# Patient Record
Sex: MALE | Race: WHITE | NOT HISPANIC OR LATINO | Employment: OTHER | URBAN - METROPOLITAN AREA
[De-identification: names, ages, dates, MRNs, and addresses within clinical notes are randomized per-mention and may not be internally consistent; named-entity substitution may affect disease eponyms.]

---

## 2019-11-16 ENCOUNTER — HOSPITAL ENCOUNTER (INPATIENT)
Facility: HOSPITAL | Age: 79
LOS: 5 days | Discharge: NURSING, REHAB OR LONG-TERM CARE | End: 2019-11-21
Attending: EMERGENCY MEDICINE | Admitting: ORTHOPAEDIC SURGERY

## 2019-11-16 ENCOUNTER — APPOINTMENT (OUTPATIENT)
Dept: GENERAL RADIOLOGY | Facility: HOSPITAL | Age: 79
End: 2019-11-16

## 2019-11-16 ENCOUNTER — APPOINTMENT (OUTPATIENT)
Dept: CT IMAGING | Facility: HOSPITAL | Age: 79
End: 2019-11-16

## 2019-11-16 DIAGNOSIS — S72.002A CLOSED FRACTURE OF NECK OF LEFT FEMUR, INITIAL ENCOUNTER (HCC): Primary | ICD-10-CM

## 2019-11-16 PROBLEM — K21.9 GERD (GASTROESOPHAGEAL REFLUX DISEASE): Status: ACTIVE | Noted: 2019-11-16

## 2019-11-16 PROBLEM — R07.89 CHEST PAIN, ATYPICAL: Status: ACTIVE | Noted: 2019-11-16

## 2019-11-16 PROBLEM — E78.5 HYPERLIPIDEMIA: Status: ACTIVE | Noted: 2019-11-16

## 2019-11-16 PROBLEM — R94.31 ABNORMAL EKG: Status: ACTIVE | Noted: 2019-11-16

## 2019-11-16 PROBLEM — I10 HYPERTENSION: Status: ACTIVE | Noted: 2019-11-16

## 2019-11-16 LAB
ALBUMIN SERPL-MCNC: 4.1 G/DL (ref 3.5–5.2)
ALBUMIN/GLOB SERPL: 1.3 G/DL
ALP SERPL-CCNC: 87 U/L (ref 39–117)
ALT SERPL W P-5'-P-CCNC: 12 U/L (ref 1–41)
ANION GAP SERPL CALCULATED.3IONS-SCNC: 16 MMOL/L (ref 5–15)
APTT PPP: 30.9 SECONDS (ref 22.7–35.4)
AST SERPL-CCNC: 22 U/L (ref 1–40)
BACTERIA UR QL AUTO: ABNORMAL /HPF
BASOPHILS # BLD AUTO: 0.05 10*3/MM3 (ref 0–0.2)
BASOPHILS NFR BLD AUTO: 0.6 % (ref 0–1.5)
BILIRUB SERPL-MCNC: 1.7 MG/DL (ref 0.2–1.2)
BILIRUB UR QL STRIP: NEGATIVE
BUN BLD-MCNC: 12 MG/DL (ref 8–23)
BUN/CREAT SERPL: 13.5 (ref 7–25)
CALCIUM SPEC-SCNC: 9.5 MG/DL (ref 8.6–10.5)
CHLORIDE SERPL-SCNC: 97 MMOL/L (ref 98–107)
CK SERPL-CCNC: 127 U/L (ref 20–200)
CLARITY UR: CLEAR
CO2 SERPL-SCNC: 28 MMOL/L (ref 22–29)
COD CRY URNS QL: ABNORMAL /HPF
COLOR UR: ABNORMAL
CREAT BLD-MCNC: 0.89 MG/DL (ref 0.76–1.27)
DEPRECATED RDW RBC AUTO: 54.3 FL (ref 37–54)
EOSINOPHIL # BLD AUTO: 0.02 10*3/MM3 (ref 0–0.4)
EOSINOPHIL NFR BLD AUTO: 0.2 % (ref 0.3–6.2)
ERYTHROCYTE [DISTWIDTH] IN BLOOD BY AUTOMATED COUNT: 16.2 % (ref 12.3–15.4)
GFR SERPL CREATININE-BSD FRML MDRD: 83 ML/MIN/1.73
GLOBULIN UR ELPH-MCNC: 3.1 GM/DL
GLUCOSE BLD-MCNC: 113 MG/DL (ref 65–99)
GLUCOSE UR STRIP-MCNC: NEGATIVE MG/DL
HCT VFR BLD AUTO: 58.1 % (ref 37.5–51)
HGB BLD-MCNC: 19.3 G/DL (ref 13–17.7)
HGB UR QL STRIP.AUTO: ABNORMAL
HYALINE CASTS UR QL AUTO: ABNORMAL /LPF
IMM GRANULOCYTES # BLD AUTO: 0.05 10*3/MM3 (ref 0–0.05)
IMM GRANULOCYTES NFR BLD AUTO: 0.6 % (ref 0–0.5)
INR PPP: 1.03 (ref 0.9–1.1)
KETONES UR QL STRIP: ABNORMAL
LEUKOCYTE ESTERASE UR QL STRIP.AUTO: ABNORMAL
LYMPHOCYTES # BLD AUTO: 0.7 10*3/MM3 (ref 0.7–3.1)
LYMPHOCYTES NFR BLD AUTO: 7.7 % (ref 19.6–45.3)
MCH RBC QN AUTO: 32.2 PG (ref 26.6–33)
MCHC RBC AUTO-ENTMCNC: 33.2 G/DL (ref 31.5–35.7)
MCV RBC AUTO: 96.8 FL (ref 79–97)
MONOCYTES # BLD AUTO: 0.66 10*3/MM3 (ref 0.1–0.9)
MONOCYTES NFR BLD AUTO: 7.3 % (ref 5–12)
MUCOUS THREADS URNS QL MICRO: ABNORMAL /HPF
NEUTROPHILS # BLD AUTO: 7.56 10*3/MM3 (ref 1.7–7)
NEUTROPHILS NFR BLD AUTO: 83.6 % (ref 42.7–76)
NITRITE UR QL STRIP: POSITIVE
NRBC BLD AUTO-RTO: 0 /100 WBC (ref 0–0.2)
PH UR STRIP.AUTO: 5.5 [PH] (ref 5–8)
PLATELET # BLD AUTO: 145 10*3/MM3 (ref 140–450)
PMV BLD AUTO: 9.9 FL (ref 6–12)
POTASSIUM BLD-SCNC: 3.6 MMOL/L (ref 3.5–5.2)
PROT SERPL-MCNC: 7.2 G/DL (ref 6–8.5)
PROT UR QL STRIP: ABNORMAL
PROTHROMBIN TIME: 13.2 SECONDS (ref 11.7–14.2)
RBC # BLD AUTO: 6 10*6/MM3 (ref 4.14–5.8)
RBC # UR: ABNORMAL /HPF
REF LAB TEST METHOD: ABNORMAL
SODIUM BLD-SCNC: 141 MMOL/L (ref 136–145)
SP GR UR STRIP: >=1.03 (ref 1–1.03)
SQUAMOUS #/AREA URNS HPF: ABNORMAL /HPF
TRANS CELLS #/AREA URNS HPF: ABNORMAL /HPF
TROPONIN T SERPL-MCNC: <0.01 NG/ML (ref 0–0.03)
TROPONIN T SERPL-MCNC: <0.01 NG/ML (ref 0–0.03)
URINE MYOGLOBIN, QUALITATIVE: POSITIVE
UROBILINOGEN UR QL STRIP: ABNORMAL
WBC NRBC COR # BLD: 9.04 10*3/MM3 (ref 3.4–10.8)
WBC UR QL AUTO: ABNORMAL /HPF

## 2019-11-16 PROCEDURE — 81001 URINALYSIS AUTO W/SCOPE: CPT | Performed by: EMERGENCY MEDICINE

## 2019-11-16 PROCEDURE — 99284 EMERGENCY DEPT VISIT MOD MDM: CPT

## 2019-11-16 PROCEDURE — 72170 X-RAY EXAM OF PELVIS: CPT

## 2019-11-16 PROCEDURE — 93010 ELECTROCARDIOGRAM REPORT: CPT | Performed by: INTERNAL MEDICINE

## 2019-11-16 PROCEDURE — 85730 THROMBOPLASTIN TIME PARTIAL: CPT | Performed by: HOSPITALIST

## 2019-11-16 PROCEDURE — 84484 ASSAY OF TROPONIN QUANT: CPT | Performed by: EMERGENCY MEDICINE

## 2019-11-16 PROCEDURE — 25010000002 ONDANSETRON PER 1 MG: Performed by: EMERGENCY MEDICINE

## 2019-11-16 PROCEDURE — 85610 PROTHROMBIN TIME: CPT | Performed by: HOSPITALIST

## 2019-11-16 PROCEDURE — 82550 ASSAY OF CK (CPK): CPT | Performed by: EMERGENCY MEDICINE

## 2019-11-16 PROCEDURE — 83874 ASSAY OF MYOGLOBIN: CPT | Performed by: EMERGENCY MEDICINE

## 2019-11-16 PROCEDURE — 80053 COMPREHEN METABOLIC PANEL: CPT | Performed by: EMERGENCY MEDICINE

## 2019-11-16 PROCEDURE — 70450 CT HEAD/BRAIN W/O DYE: CPT

## 2019-11-16 PROCEDURE — 25010000002 MORPHINE PER 10 MG: Performed by: EMERGENCY MEDICINE

## 2019-11-16 PROCEDURE — 71045 X-RAY EXAM CHEST 1 VIEW: CPT

## 2019-11-16 PROCEDURE — 93005 ELECTROCARDIOGRAM TRACING: CPT | Performed by: EMERGENCY MEDICINE

## 2019-11-16 PROCEDURE — 85025 COMPLETE CBC W/AUTO DIFF WBC: CPT | Performed by: EMERGENCY MEDICINE

## 2019-11-16 PROCEDURE — 73552 X-RAY EXAM OF FEMUR 2/>: CPT

## 2019-11-16 PROCEDURE — 84484 ASSAY OF TROPONIN QUANT: CPT | Performed by: HOSPITALIST

## 2019-11-16 RX ORDER — HYDROMORPHONE HYDROCHLORIDE 1 MG/ML
0.5 INJECTION, SOLUTION INTRAMUSCULAR; INTRAVENOUS; SUBCUTANEOUS
Status: DISCONTINUED | OUTPATIENT
Start: 2019-11-16 | End: 2019-11-21

## 2019-11-16 RX ORDER — ONDANSETRON 2 MG/ML
4 INJECTION INTRAMUSCULAR; INTRAVENOUS EVERY 6 HOURS PRN
Status: DISCONTINUED | OUTPATIENT
Start: 2019-11-16 | End: 2019-11-21 | Stop reason: HOSPADM

## 2019-11-16 RX ORDER — NALOXONE HCL 0.4 MG/ML
0.4 VIAL (ML) INJECTION
Status: DISCONTINUED | OUTPATIENT
Start: 2019-11-16 | End: 2019-11-21

## 2019-11-16 RX ORDER — SODIUM CHLORIDE 0.9 % (FLUSH) 0.9 %
10 SYRINGE (ML) INJECTION AS NEEDED
Status: DISCONTINUED | OUTPATIENT
Start: 2019-11-16 | End: 2019-11-21 | Stop reason: HOSPADM

## 2019-11-16 RX ORDER — SODIUM CHLORIDE 0.9 % (FLUSH) 0.9 %
10 SYRINGE (ML) INJECTION EVERY 12 HOURS SCHEDULED
Status: DISCONTINUED | OUTPATIENT
Start: 2019-11-16 | End: 2019-11-21 | Stop reason: HOSPADM

## 2019-11-16 RX ORDER — ONDANSETRON 4 MG/1
4 TABLET, FILM COATED ORAL EVERY 6 HOURS PRN
Status: DISCONTINUED | OUTPATIENT
Start: 2019-11-16 | End: 2019-11-21 | Stop reason: HOSPADM

## 2019-11-16 RX ORDER — MORPHINE SULFATE 2 MG/ML
4 INJECTION, SOLUTION INTRAMUSCULAR; INTRAVENOUS ONCE
Status: COMPLETED | OUTPATIENT
Start: 2019-11-16 | End: 2019-11-16

## 2019-11-16 RX ORDER — HYDRALAZINE HYDROCHLORIDE 20 MG/ML
10 INJECTION INTRAMUSCULAR; INTRAVENOUS EVERY 6 HOURS PRN
Status: DISCONTINUED | OUTPATIENT
Start: 2019-11-16 | End: 2019-11-21 | Stop reason: HOSPADM

## 2019-11-16 RX ORDER — ONDANSETRON 2 MG/ML
4 INJECTION INTRAMUSCULAR; INTRAVENOUS ONCE
Status: COMPLETED | OUTPATIENT
Start: 2019-11-16 | End: 2019-11-16

## 2019-11-16 RX ORDER — LISINOPRIL AND HYDROCHLOROTHIAZIDE 20; 12.5 MG/1; MG/1
1 TABLET ORAL DAILY
COMMUNITY
End: 2019-11-21 | Stop reason: HOSPADM

## 2019-11-16 RX ORDER — SODIUM CHLORIDE 9 MG/ML
125 INJECTION, SOLUTION INTRAVENOUS CONTINUOUS
Status: DISCONTINUED | OUTPATIENT
Start: 2019-11-16 | End: 2019-11-18

## 2019-11-16 RX ORDER — ACETAMINOPHEN 650 MG/1
650 SUPPOSITORY RECTAL EVERY 4 HOURS PRN
Status: DISCONTINUED | OUTPATIENT
Start: 2019-11-16 | End: 2019-11-21 | Stop reason: HOSPADM

## 2019-11-16 RX ORDER — LISINOPRIL 40 MG/1
40 TABLET ORAL DAILY
Status: ON HOLD | COMMUNITY
End: 2019-11-16

## 2019-11-16 RX ORDER — FAMOTIDINE 20 MG/1
20 TABLET, FILM COATED ORAL
Status: DISCONTINUED | OUTPATIENT
Start: 2019-11-16 | End: 2019-11-21 | Stop reason: HOSPADM

## 2019-11-16 RX ORDER — ACETAMINOPHEN 160 MG/5ML
650 SOLUTION ORAL EVERY 4 HOURS PRN
Status: DISCONTINUED | OUTPATIENT
Start: 2019-11-16 | End: 2019-11-21 | Stop reason: HOSPADM

## 2019-11-16 RX ORDER — HYDROCODONE BITARTRATE AND ACETAMINOPHEN 5; 325 MG/1; MG/1
1 TABLET ORAL EVERY 4 HOURS PRN
Status: DISCONTINUED | OUTPATIENT
Start: 2019-11-16 | End: 2019-11-17

## 2019-11-16 RX ORDER — ACETAMINOPHEN 325 MG/1
650 TABLET ORAL EVERY 4 HOURS PRN
Status: DISCONTINUED | OUTPATIENT
Start: 2019-11-16 | End: 2019-11-21 | Stop reason: HOSPADM

## 2019-11-16 RX ORDER — SODIUM CHLORIDE 0.9 % (FLUSH) 0.9 %
10 SYRINGE (ML) INJECTION AS NEEDED
Status: DISCONTINUED | OUTPATIENT
Start: 2019-11-16 | End: 2019-11-16

## 2019-11-16 RX ADMIN — FAMOTIDINE 20 MG: 20 TABLET, FILM COATED ORAL at 18:04

## 2019-11-16 RX ADMIN — SODIUM CHLORIDE 125 ML/HR: 9 INJECTION, SOLUTION INTRAVENOUS at 11:40

## 2019-11-16 RX ADMIN — SODIUM CHLORIDE 125 ML/HR: 9 INJECTION, SOLUTION INTRAVENOUS at 23:45

## 2019-11-16 RX ADMIN — METOPROLOL TARTRATE 25 MG: 25 TABLET ORAL at 21:55

## 2019-11-16 RX ADMIN — MORPHINE SULFATE 4 MG: 2 INJECTION, SOLUTION INTRAMUSCULAR; INTRAVENOUS at 11:41

## 2019-11-16 RX ADMIN — MUPIROCIN 1 APPLICATION: 20 OINTMENT TOPICAL at 21:56

## 2019-11-16 RX ADMIN — ONDANSETRON 4 MG: 2 INJECTION INTRAMUSCULAR; INTRAVENOUS at 11:40

## 2019-11-16 NOTE — ED NOTES
Pt left leg shortened and externally rotated states no pain when not moving and increased pain with standing. Pt fell Wednesday. Pt falls asleep during triage able to answer all questions.      Aaron Guevara RN  11/16/19 4557

## 2019-11-16 NOTE — CONSULTS
Orthopedic Consult    Patient: Corey Lawson                                           YOB: 1940     Date of Admission: 11/16/2019  9:15 AM            Medical Record Number: [id number]    Attending Physician: Quang Mcadams MD    Consulting Physician: Dary Reyes MD    Reason for Consult: LEFT  hip fracture.    History of Present Illness: 79 y.o. male admitted to [facility] with Closed fracture of neck of left femur, initial encounter (CMS/[facility]) [S72.002A].     The patient was evaluated in the emergency room and was diagnosed with a  hip fracture.   Secondary to the age / multiple medical co morbidities the patient was admitted to the hospitalist.   As I was on call for the emergency room I was consulted for further evaluation and treatment.     The patient was in the usual state of health and fell from standing height at home about 3 days back, Resulting in sudden onset hip pain and inability to ambulate. He has been non compliant with his medications at home.   Denies any history of loss of consciousness, headache, vomiting, or seizures.   Denies any other injuries.   The patient is accompanied by his wife family members  to this hospital visit.     The patient denies any prior  pre-existing pain in the hip.  Patient is a  community ambulator. Patient  uses cane assistive device.   The patient  lives at home with  His wife, is quite active and independent in activities of daily living.  The patient denies history of dementia.    Patient denies any history of: DVT/PE, MRSA, COPD, CHF, CAD, Diabetes mellitus, Dementia or A-Fib.   The patient has history of : Heart attack , left sided weakness ? Stroke, left foot drop chronic  The patient is not on anticoagulants:     Past medical history, past surgical history, social history, family history, ALLERGIES, current medications have been reviewed by me.    Past Medical History:   Diagnosis Date   • GERD (gastroesophageal reflux  "disease)    • Hyperlipidemia    • Hypertension      Past Surgical History:   Procedure Laterality Date   • CARDIAC CATHETERIZATION     • TONSILLECTOMY       Social History     Occupational History   • Not on file   Tobacco Use   • Smoking status: Former Smoker   Substance and Sexual Activity   • Alcohol use: No     Frequency: Never   • Drug use: No   • Sexual activity: Not on file      Social History     Social History Narrative   • Not on file     History reviewed. No pertinent family history.     Allergies   Allergen Reactions   • Nexium [Esomeprazole Magnesium] Diarrhea       Home Medications:  Medications Prior to Admission   Medication Sig Dispense Refill Last Dose   • lisinopril-hydrochlorothiazide (PRINZIDE,ZESTORETIC) 20-12.5 MG per tablet Take 1 tablet by mouth Daily.   11/15/2019 at Unknown time       Current Medications:  Scheduled Meds:  [START ON 11/17/2019] influenza vaccine 0.5 mL Intramuscular Once     Continuous Infusions:  sodium chloride 125 mL/hr Last Rate: 125 mL/hr (11/16/19 1140)     PRN Meds:.    Review of Systems:   A 12 point system review was reviewed with the patient and from the chart  and is negative except as in history of present illness.      Physical Exam: 79 y.o. male                    Vitals:    11/16/19 1130 11/16/19 1142 11/16/19 1230 11/16/19 1330   BP: (!) 176/103  129/90 147/91   BP Location:    Right arm   Patient Position:    Lying   Pulse:  93  96   Resp:    16   Temp:    98.4 °F (36.9 °C)   TempSrc:    Oral   SpO2:  99% 96% 96%   Weight:    73.6 kg (162 lb 4.1 oz)   Height:    180 cm (70.87\")        Gait: Could not be tested , patient is nonambulatory.    Mental/HEENT/cardio/skin: The patient's general appearance was well-nourished, well-hydrated, no acute distress.  Orientation was alert and oriented ×3.  The patient's mood was normal.   Pulmonary exam shows normal late exchange, no labored breathing, or shortness of breath.    The skin exam showed normal temperature " and color in the area of examination.    Extremities: LEFT   lower extremity positive shortening, positive external rotation, attempted movements of the  hip are painful and restricted. The patient is able to do gentle active range of motion of her toes. Gross sensation is intact over the toes.    Pulses: Pulses palpable and equal bilaterally    Diagnostic Tests:    Results from last 7 days   Lab Units 11/16/19  0940   WBC 10*3/mm3 9.04   HEMOGLOBIN g/dL 19.3*   HEMATOCRIT % 58.1*   PLATELETS 10*3/mm3 145     Results from last 7 days   Lab Units 11/16/19  0940   SODIUM mmol/L 141   POTASSIUM mmol/L 3.6   CHLORIDE mmol/L 97*   CO2 mmol/L 28.0   BUN mg/dL 12   CREATININE mg/dL 0.89   GLUCOSE mg/dL 113*   CALCIUM mg/dL 9.5         No results found for: URICACID  No results found for: CRYSTAL  Microbiology Results (last 10 days)     ** No results found for the last 240 hours. **        No radiology results for the last 7 days    Assessment: Left  Intracapsular Hip Fracture. Paul type 4    Patient Active Problem List   Diagnosis   • Closed fracture of neck of left femur (CMS/HCC)       Plan:    Options and alternatives have been discussed in detail with patient and  family.   The patient is indicated for a partial or total hip arthroplasty.    The likely,  Risks and benefits of the procedure including but not limited to infection, DVT, pulmonary embolism,  leg length discrepancy, recurrent dislocation, possibility of injury to nerves or vessels, possibility of periprosthetic fractures have been discussed in detail.  Despite the risks involved, The patient and patient's family  would like to proceed.     The patient is being scheduled for a  total hip arthroplasty by anterior  approach at [facility] tentatively for Nov 17, 2019.     Patient will be made NPO.   Obtain informed consent.     The patient's admitting service has seen the patient and the patient is waiting to be cleared to the operating room.    Date:  11/16/2019    Dary Reyes MD    CC: Provider, No Known; MD Pema Chirinos Lyle E, MD

## 2019-11-16 NOTE — ED TRIAGE NOTES
EMs reports pt fell on Wednesday because of weakness in his left leg. Pt reports the pain in his leg is increasing. Pt reports hitting his head but denies blood thinners. Pt reports he is also having hallucinations

## 2019-11-16 NOTE — PLAN OF CARE
Problem: Patient Care Overview  Goal: Plan of Care Review  Outcome: Ongoing (interventions implemented as appropriate)   11/16/19 1330 11/16/19 8667   Plan of Care Review   Progress --  no change   OTHER   Outcome Summary --  pt admitted from ER today SP fall at home on wednesday. unable to bear weight and increased pain. plan to go to OR for total hip tomorrow. NPO after mn. confusion noted. educated on the importance of BP monitoring and the importance of medication as ordered for HO HTN. Verbalized understanding. will cont to monitor.    Coping/Psychosocial   Plan of Care Reviewed With patient --      Goal: Individualization and Mutuality  Outcome: Ongoing (interventions implemented as appropriate)    Goal: Discharge Needs Assessment  Outcome: Ongoing (interventions implemented as appropriate)    Goal: Interprofessional Rounds/Family Conf  Outcome: Ongoing (interventions implemented as appropriate)      Problem: Fall Risk (Adult)  Goal: Identify Related Risk Factors and Signs and Symptoms  Outcome: Ongoing (interventions implemented as appropriate)    Goal: Absence of Fall  Outcome: Ongoing (interventions implemented as appropriate)      Problem: Skin Injury Risk (Adult)  Goal: Identify Related Risk Factors and Signs and Symptoms  Outcome: Ongoing (interventions implemented as appropriate)    Goal: Skin Health and Integrity  Outcome: Ongoing (interventions implemented as appropriate)      Problem: Fracture Orthopaedic (Adult)  Goal: Signs and Symptoms of Listed Potential Problems Will be Absent, Minimized or Managed (Fracture Orthopaedic)  Outcome: Ongoing (interventions implemented as appropriate)

## 2019-11-16 NOTE — H&P
HISTORY AND PHYSICAL   [facility]        Patient Identification:  Name: Corey Lawson  Age: 79 y.o.  Sex: male  :  1940  MRN: [id number]                     Primary Care Physician: Provider, No Known    Chief Complaint:  Left hip pain    History of Present Illness:           The patient is a 79-year-old white male with history of gastroesophageal reflux disease, hypertension hyperlipidemia who was admitted with history of having fallen down when he went to the bathroom 3 days prior to admission.  He has been having some hip pain ever since and a lot of difficulty getting around.  Finally his family got him to come to the hospital for further evaluation and he was found to have a left femoral neck fracture.  He has not had any nausea or vomiting.  He denies having any fever or chills.  He has not been short of air.  He has had a little bit of aching in his chest but he thinks it may be due to reflux symptoms.  Prior to falling down he was not having any exertional chest pain.  He admits that he has been noncompliant and is been out of all of his medicine for several months and has not been seeing a physician.  He moved here from [place].  The patient was evaluated in the ER and admitted for left hip fracture.    Past Medical History:  Past Medical History:   Diagnosis Date   • GERD (gastroesophageal reflux disease)    • Hyperlipidemia    • Hypertension      Past Surgical History:  Past Surgical History:   Procedure Laterality Date   • CARDIAC CATHETERIZATION     • TONSILLECTOMY        Home Meds:  Medications Prior to Admission   Medication Sig Dispense Refill Last Dose   • lisinopril-hydrochlorothiazide (PRINZIDE,ZESTORETIC) 20-12.5 MG per tablet Take 1 tablet by mouth Daily.   11/15/2019 at Unknown time     Current meds    Current Facility-Administered Medications:   •  acetaminophen (TYLENOL) tablet 650 mg, 650 mg, Oral, Q4H PRN **OR** acetaminophen (TYLENOL) 160 MG/5ML solution 650 mg, 650 mg,  Oral, Q4H PRN **OR** acetaminophen (TYLENOL) suppository 650 mg, 650 mg, Rectal, Q4H PRN, Quang Mcadams MD  •  famotidine (PEPCID) tablet 20 mg, 20 mg, Oral, BID AC, Quang Mcadams MD, 20 mg at 11/16/19 1804  •  hydrALAZINE (APRESOLINE) injection 10 mg, 10 mg, Intravenous, Q6H PRN, Quang Mcadams MD  •  HYDROcodone-acetaminophen (NORCO) 5-325 MG per tablet 1 tablet, 1 tablet, Oral, Q4H PRN, Quang Mcadams MD  •  HYDROmorphone (DILAUDID) injection 0.5 mg, 0.5 mg, Intravenous, Q2H PRN **AND** naloxone (NARCAN) injection 0.4 mg, 0.4 mg, Intravenous, Q5 Min PRN, Quang Mcadams MD  •  [START ON 11/17/2019] influenza vac split quad (FLUZONE,FLUARIX,AFLURIA) injection 0.5 mL, 0.5 mL, Intramuscular, Once, Quang Mcadams MD  •  metoprolol tartrate (LOPRESSOR) tablet 25 mg, 25 mg, Oral, Q12H, Quang Mcadams MD  •  mupirocin (BACTROBAN) 2 % nasal ointment 1 application, 1 application, Each Nare, Once, aDry Reyes MD  •  ondansetron (ZOFRAN) tablet 4 mg, 4 mg, Oral, Q6H PRN **OR** ondansetron (ZOFRAN) injection 4 mg, 4 mg, Intravenous, Q6H PRN, Quang Mcadams MD  •  sodium chloride 0.9 % flush 10 mL, 10 mL, Intravenous, Q12H, Quang Mcadams MD  •  sodium chloride 0.9 % flush 10 mL, 10 mL, Intravenous, PRN, Quang Mcadams MD  •  sodium chloride 0.9 % infusion, 125 mL/hr, Intravenous, Continuous, Jared Manzo MD, Last Rate: 125 mL/hr at 11/16/19 1140, 125 mL/hr at 11/16/19 1140  Allergies:  Allergies   Allergen Reactions   • Nexium [Esomeprazole Magnesium] Diarrhea     Immunizations:  There is no immunization history for the selected administration types on file for this patient.  Social History:   Social History     Social History Narrative   • Not on file     Social History     Socioeconomic History   • Marital status:      Spouse name: Not on file   • Number of children: Not on file   • Years of education: Not on file   • Highest education level: Not on file   Tobacco Use   • Smoking status: Former  "Smoker   Substance and Sexual Activity   • Alcohol use: No     Frequency: Never   • Drug use: No       Family History:  Family History   Problem Relation Age of Onset   • Pancreatic cancer Mother    • Cancer Father         Review of Systems  See history of present illness and past medical history.  Patient denies headache, dizziness, syncope, falls, trauma, change in vision, change in hearing, change in taste, changes in weight, changes in appetite, focal weakness, numbness, or paresthesia.  Patient denies  palpitations, dyspnea, orthopnea, PND, cough, sinus pressure, rhinorrhea, epistaxis, hemoptysis, nausea, vomiting, hematemesis, diarrhea, constipation or hematchezia.  Denies cold or heat intolerance, polydipsia, polyuria, polyphagia. Denies hematuria, pyuria, dysuria, hesitancy, frequency or urgency.  Denies fever, chills, sweats, night sweats.   Remainder of ROS is negative.    Objective:  tMax 24 hrs: Temp (24hrs), Av.1 °F (36.7 °C), Min:97.9 °F (36.6 °C), Max:98.4 °F (36.9 °C)    Vitals Ranges:   Temp:  [97.9 °F (36.6 °C)-98.4 °F (36.9 °C)] 97.9 °F (36.6 °C)  Heart Rate:  [] 96  Resp:  [16-18] 16  BP: (129-176)/() 140/97      Exam:  /97 (BP Location: Right arm, Patient Position: Lying)   Pulse 96   Temp 97.9 °F (36.6 °C) (Oral)   Resp 16   Ht 180 cm (70.87\")   Wt 73.6 kg (162 lb 4.1 oz)   SpO2 97%   BMI 22.72 kg/m²     General Appearance:    Alert, cooperative, no distress, appears stated age   Head:    Normocephalic, without obvious abnormality, atraumatic   Eyes:    PERRL, conjunctiva/corneas clear, EOM's intact, both eyes   Ears:    Normal external ear canals, both ears   Nose:   Nares normal, septum midline, mucosa normal, no drainage    or sinus tenderness   Throat:   Lips, mucosa, and tongue normal   Neck:   Supple, symmetrical, trachea midline, no adenopathy;     thyroid:  no enlargement/tenderness/nodules; no carotid    bruit or JVD   Back:     Symmetric, no curvature, ROM " normal, no CVA tenderness   Lungs:     Clear to auscultation bilaterally, respirations unlabored   Chest Wall:    No tenderness or deformity    Heart:    Regular rate and rhythm, S1 and S2 normal, no murmur, rub   or gallop   Abdomen:     Soft, non-tender, bowel sounds active all four quadrants,     no masses, no hepatomegaly, no splenomegaly   Extremities:   Extremities normal,left hip deformity, no cyanosis or edema   Pulses:   2+ and symmetric all extremities   Skin:   Skin color, texture, turgor normal, no rashes or lesions   Lymph nodes:   Cervical, supraclavicular, and axillary nodes normal   Neurologic:   CNII-XII intact, normal strength, sensation intact throughout      .    Data Review:  Lab Results (last 72 hours)     Procedure Component Value Units Date/Time    Comprehensive Metabolic Panel [[id number]]  (Abnormal) Collected:  11/16/19 0940    Specimen:  Blood Updated:  11/16/19 1016     Glucose 113 mg/dL      BUN 12 mg/dL      Creatinine 0.89 mg/dL      Sodium 141 mmol/L      Potassium 3.6 mmol/L      Chloride 97 mmol/L      CO2 28.0 mmol/L      Calcium 9.5 mg/dL      Total Protein 7.2 g/dL      Albumin 4.10 g/dL      ALT (SGPT) 12 U/L      AST (SGOT) 22 U/L      Alkaline Phosphatase 87 U/L      Total Bilirubin 1.7 mg/dL      eGFR Non African Amer 83 mL/min/1.73      Globulin 3.1 gm/dL      A/G Ratio 1.3 g/dL      BUN/Creatinine Ratio 13.5     Anion Gap 16.0 mmol/L     Narrative:       GFR Normal >60  Chronic Kidney Disease <60  Kidney Failure <15    Troponin [[id number]]  (Normal) Collected:  11/16/19 0940    Specimen:  Blood Updated:  11/16/19 1016     Troponin T <0.010 ng/mL     Narrative:       Troponin T Reference Range:  <= 0.03 ng/mL-   Negative for AMI  >0.03 ng/mL-     Abnormal for myocardial necrosis.  Clinicians would have to utilize clinical acumen, EKG, Troponin and serial changes to determine if it is an Acute Myocardial Infarction or myocardial injury due to an underlying chronic condition.      CK [[id number]]  (Normal) Collected:  11/16/19 0940    Specimen:  Blood Updated:  11/16/19 1016     Creatine Kinase 127 U/L     CBC & Differential [[id number]] Collected:  11/16/19 0940    Specimen:  Blood Updated:  11/16/19 0959    Narrative:       The following orders were created for panel order CBC & Differential.  Procedure                               Abnormality         Status                     ---------                               -----------         ------                     CBC Auto Differential[[id number]]        Abnormal            Final result                 Please view results for these tests on the individual orders.    CBC Auto Differential [[id number]]  (Abnormal) Collected:  11/16/19 0940    Specimen:  Blood Updated:  11/16/19 0959     WBC 9.04 10*3/mm3      RBC 6.00 10*6/mm3      Hemoglobin 19.3 g/dL      Hematocrit 58.1 %      MCV 96.8 fL      MCH 32.2 pg      MCHC 33.2 g/dL      RDW 16.2 %      RDW-SD 54.3 fl      MPV 9.9 fL      Platelets 145 10*3/mm3      Neutrophil % 83.6 %      Lymphocyte % 7.7 %      Monocyte % 7.3 %      Eosinophil % 0.2 %      Basophil % 0.6 %      Immature Grans % 0.6 %      Neutrophils, Absolute 7.56 10*3/mm3      Lymphocytes, Absolute 0.70 10*3/mm3      Monocytes, Absolute 0.66 10*3/mm3      Eosinophils, Absolute 0.02 10*3/mm3      Basophils, Absolute 0.05 10*3/mm3      Immature Grans, Absolute 0.05 10*3/mm3      nRBC 0.0 /100 WBC                    Imaging Results (All)     Procedure Component Value Units Date/Time    XR Chest 1 View [[id number]] Collected:  11/16/19 1049     Updated:  11/16/19 1210    Narrative:       ONE VIEW AP PELVIS AND TWO VIEW LEFT FEMUR     HISTORY: Fell. Hip pain.     FINDINGS: There is a fracture through the neck of the left femur with  very slight superior elevation of the femur relative to the femoral  head. The remainder of the femur is unremarkable.     ONE VIEW SUPINE CHEST     HISTORY: Fell. Hip fracture. Hypertension.      FINDINGS: The lungs are well expanded and clear and the heart is top  normal in size. There is no acute disease.     This report was finalized on 11/16/2019 12:07 PM by Dr. Pee Emmanuel M.D.       XR Pelvis 1 or 2 View [[id number]] Collected:  11/16/19 1049     Updated:  11/16/19 1210    Narrative:       ONE VIEW AP PELVIS AND TWO VIEW LEFT FEMUR     HISTORY: Fell. Hip pain.     FINDINGS: There is a fracture through the neck of the left femur with  very slight superior elevation of the femur relative to the femoral  head. The remainder of the femur is unremarkable.     ONE VIEW SUPINE CHEST     HISTORY: Fell. Hip fracture. Hypertension.     FINDINGS: The lungs are well expanded and clear and the heart is top  normal in size. There is no acute disease.     This report was finalized on 11/16/2019 12:07 PM by Dr. Pee Emmanuel M.D.       XR Femur 2 View Left [[id number]] Collected:  11/16/19 1049     Updated:  11/16/19 1210    Narrative:       ONE VIEW AP PELVIS AND TWO VIEW LEFT FEMUR     HISTORY: Fell. Hip pain.     FINDINGS: There is a fracture through the neck of the left femur with  very slight superior elevation of the femur relative to the femoral  head. The remainder of the femur is unremarkable.     ONE VIEW SUPINE CHEST     HISTORY: Fell. Hip fracture. Hypertension.     FINDINGS: The lungs are well expanded and clear and the heart is top  normal in size. There is no acute disease.     This report was finalized on 11/16/2019 12:07 PM by Dr. Pee Emmanuel M.D.       CT Head Without Contrast [[id number]] Collected:  11/16/19 1044     Updated:  11/16/19 1210    Narrative:       CT BRAIN WITHOUT CONTRAST     HISTORY: Fell. Weakness in left leg.     TECHNIQUE/FINDINGS: The CT scan was performed as an emergency procedure  through the brain without contrast. There is mild diffuse atrophy and  chronic small vessel ischemic change. There is slight asymmetry of the  lateral ventricles which is within normal limits  and there is no  evidence of acute hemorrhage or mass effect. The visualized sinuses are  clear.           Radiation dose reduction techniques were utilized, including automated  exposure control and exposure modulation based on body size.     This report was finalized on 11/16/2019 12:07 PM by Dr. Pee Emmanuel M.D.           Past Medical History:   Diagnosis Date   • GERD (gastroesophageal reflux disease)    • Hyperlipidemia    • Hypertension        Assessment:  Active Hospital Problems    Diagnosis  POA   • **Closed fracture of neck of left femur (CMS/HCC) [S72.002A]  Yes   • Hypertension [I10]  Unknown   • Hyperlipidemia [E78.5]  Unknown   • GERD (gastroesophageal reflux disease) [K21.9]  Unknown   • Chest pain, atypical [R07.89]  Unknown   • Abnormal EKG [R94.31]  Unknown      Resolved Hospital Problems   No resolved problems to display.       Plan:  The patient is admitted to the hospital and will consult orthopedic surgery for surgical treatment of his hip fracture.  We will put him on a beta-blocker for his blood pressure and check some repeat troponin levels and repeat lab.  If his troponins are normal I will clear him as medically stable for surgery.  If his troponins elevate would  get further input from cardiology.  We will add some as needed hydralazine IV for blood pressure.  I have ordered medication for pain.  Hopefully can proceed with surgery early tomorrow morning and certainly if further delays in fixing his fractured hip increases his morbidity and mortality.  Discussed with Dr. Reyes.    Quang Macdams MD  11/16/2019  7:36 PM

## 2019-11-16 NOTE — ED PROVIDER NOTES
" EMERGENCY DEPARTMENT ENCOUNTER    CHIEF COMPLAINT  Chief Complaint: L hip pain  History given by: pt  History limited by: nothing  Room Number: 10/10  PMD: Provider, No Known      HPI:  Pt is a 79 y.o. male who presents complaining of constant worsening L hip pain due to a fall that occurred 3 days ago.  Pt states that he tripped, landed on his L hip and struck his head on the ground.  He has been unable to walk since the fall.  Pt reports chronic L leg weakness due to a previous stroke.  He also confirms chronic CP and chronic SOA.  He denies visual disturbances, HA, nausea, vomiting, and LOC.  He states that he is supposed to be taking BP medications but has not for some time due to \"carelessness.\"    Duration:  3 days  Onset: sudden  Timing: constant  Location: L hip  Progression: worsening    PAST MEDICAL HISTORY  Active Ambulatory Problems     Diagnosis Date Noted   • No Active Ambulatory Problems     Resolved Ambulatory Problems     Diagnosis Date Noted   • No Resolved Ambulatory Problems     Past Medical History:   Diagnosis Date   • GERD (gastroesophageal reflux disease)    • Hyperlipidemia    • Hypertension        PAST SURGICAL HISTORY  Past Surgical History:   Procedure Laterality Date   • CARDIAC CATHETERIZATION     • TONSILLECTOMY         FAMILY HISTORY  History reviewed. No pertinent family history.    SOCIAL HISTORY  Social History     Socioeconomic History   • Marital status:      Spouse name: Not on file   • Number of children: Not on file   • Years of education: Not on file   • Highest education level: Not on file   Tobacco Use   • Smoking status: Former Smoker   Substance and Sexual Activity   • Alcohol use: No     Frequency: Never   • Drug use: No       ALLERGIES  Nexium [esomeprazole magnesium]    REVIEW OF SYSTEMS  Review of Systems   Constitutional: Negative for activity change, appetite change and fever.   HENT: Negative for congestion and sore throat.    Eyes: Negative.  "   Respiratory: Positive for shortness of breath ( chronic). Negative for cough.    Cardiovascular: Positive for chest pain ( chronic). Negative for leg swelling.   Gastrointestinal: Negative for abdominal pain, diarrhea and vomiting.   Endocrine: Negative.    Genitourinary: Negative for decreased urine volume and dysuria.   Musculoskeletal: Positive for arthralgias (L hip) and gait problem. Negative for neck pain.   Skin: Negative for rash and wound.   Allergic/Immunologic: Negative.    Neurological: Positive for weakness (Chronic, L leg). Negative for syncope, numbness and headaches.   Hematological: Negative.    Psychiatric/Behavioral: Negative.    All other systems reviewed and are negative.      PHYSICAL EXAM  ED Triage Vitals [11/16/19 0914]   Temp Heart Rate Resp BP SpO2   98.1 °F (36.7 °C) 106 18 (!) 171/111 99 %      Temp src Heart Rate Source Patient Position BP Location FiO2 (%)   Tympanic -- -- -- --       Physical Exam   Constitutional: He is oriented to person, place, and time. No distress.   HENT:   Head: Normocephalic and atraumatic.   Eyes: EOM are normal. Pupils are equal, round, and reactive to light.   Neck: Normal range of motion. Neck supple.   Cardiovascular: Normal rate, regular rhythm and normal heart sounds.   Pulmonary/Chest: Effort normal and breath sounds normal. No respiratory distress.   Abdominal: Soft. There is no tenderness. There is no rebound and no guarding.   Musculoskeletal: He exhibits no edema.   L hip and groin tenderness. L leg is slightly shorter than R leg.  Pain increases with L hip flexion.     Neurological: He is alert and oriented to person, place, and time. He has normal sensation and normal strength.   Skin: Skin is warm and dry.   Psychiatric: Mood and affect normal.   Nursing note and vitals reviewed.      LAB RESULTS  Lab Results (last 24 hours)     Procedure Component Value Units Date/Time    CBC & Differential [[id number]] Collected:  11/16/19 0940    Specimen:   Blood Updated:  11/16/19 0959    Narrative:       The following orders were created for panel order CBC & Differential.  Procedure                               Abnormality         Status                     ---------                               -----------         ------                     CBC Auto Differential[[id number]]        Abnormal            Final result                 Please view results for these tests on the individual orders.    Comprehensive Metabolic Panel [[id number]]  (Abnormal) Collected:  11/16/19 0940    Specimen:  Blood Updated:  11/16/19 1016     Glucose 113 mg/dL      BUN 12 mg/dL      Creatinine 0.89 mg/dL      Sodium 141 mmol/L      Potassium 3.6 mmol/L      Chloride 97 mmol/L      CO2 28.0 mmol/L      Calcium 9.5 mg/dL      Total Protein 7.2 g/dL      Albumin 4.10 g/dL      ALT (SGPT) 12 U/L      AST (SGOT) 22 U/L      Alkaline Phosphatase 87 U/L      Total Bilirubin 1.7 mg/dL      eGFR Non African Amer 83 mL/min/1.73      Globulin 3.1 gm/dL      A/G Ratio 1.3 g/dL      BUN/Creatinine Ratio 13.5     Anion Gap 16.0 mmol/L     Narrative:       GFR Normal >60  Chronic Kidney Disease <60  Kidney Failure <15    Troponin [[id number]]  (Normal) Collected:  11/16/19 0940    Specimen:  Blood Updated:  11/16/19 1016     Troponin T <0.010 ng/mL     Narrative:       Troponin T Reference Range:  <= 0.03 ng/mL-   Negative for AMI  >0.03 ng/mL-     Abnormal for myocardial necrosis.  Clinicians would have to utilize clinical acumen, EKG, Troponin and serial changes to determine if it is an Acute Myocardial Infarction or myocardial injury due to an underlying chronic condition.     CK [[id number]]  (Normal) Collected:  11/16/19 0940    Specimen:  Blood Updated:  11/16/19 1016     Creatine Kinase 127 U/L     CBC Auto Differential [[id number]]  (Abnormal) Collected:  11/16/19 0940    Specimen:  Blood Updated:  11/16/19 0959     WBC 9.04 10*3/mm3      RBC 6.00 10*6/mm3      Hemoglobin 19.3 g/dL      Hematocrit  58.1 %      MCV 96.8 fL      MCH 32.2 pg      MCHC 33.2 g/dL      RDW 16.2 %      RDW-SD 54.3 fl      MPV 9.9 fL      Platelets 145 10*3/mm3      Neutrophil % 83.6 %      Lymphocyte % 7.7 %      Monocyte % 7.3 %      Eosinophil % 0.2 %      Basophil % 0.6 %      Immature Grans % 0.6 %      Neutrophils, Absolute 7.56 10*3/mm3      Lymphocytes, Absolute 0.70 10*3/mm3      Monocytes, Absolute 0.66 10*3/mm3      Eosinophils, Absolute 0.02 10*3/mm3      Basophils, Absolute 0.05 10*3/mm3      Immature Grans, Absolute 0.05 10*3/mm3      nRBC 0.0 /100 WBC           I ordered the above labs and reviewed the results    RADIOLOGY  XR Pelvis 1 or 2 View   ONE VIEW AP PELVIS AND TWO VIEW LEFT FEMUR     HISTORY: Fell. Hip pain.     FINDINGS: There is a fracture through the neck of the left femur with  very slight superior elevation of the femur relative to the femoral  head. The remainder of the femur is unremarkable.     ONE VIEW SUPINE CHEST     HISTORY: Fell. Hip fracture. Hypertension.     FINDINGS: The lungs are well expanded and clear and the heart is top  normal in size. There is no acute disease.      XR Femur 2 View Left         XR Chest 1 View         CT Head Without Contrast   CT BRAIN WITHOUT CONTRAST     HISTORY: Fell. Weakness in left leg.     TECHNIQUE/FINDINGS: The CT scan was performed as an emergency procedure  through the brain without contrast. There is mild diffuse atrophy and  chronic small vessel ischemic change. There is slight asymmetry of the  lateral ventricles which is within normal limits and there is no  evidence of acute hemorrhage or mass effect. The visualized sinuses are  clear.           Radiation dose reduction techniques were utilized, including automated  exposure control and exposure modulation based on body size.           I ordered the above noted radiological studies. Interpreted by radiologist. Discussed with radiologist Dr. Emmanuel. Reviewed by me in PACS.        PROCEDURES  Procedures  EKG          EKG time: 0934  Rhythm/Rate: Tachy/100  P waves and MD: 1st degree block  QRS, axis: LAD, RBBB   ST and T waves: nonspecific ST changes     Interpreted Contemporaneously by me, independently viewed  No prior EKG for comparison      PROGRESS AND CONSULTS     1044: Rechecked the patient who is resting comfortably and in NAD. Patient is stable.  Informed the patient of imaging which showed L femoral neck fracture. Discussed the plan for admission for further evaluation and management. Pt understands and agrees with the plan, all questions answered.    1152: Received call from Dr. Mcadams, ([facility]) and discussed pt's case.  Dr. Mcadams will admit the pt.        MEDICAL DECISION MAKING  Results were reviewed/discussed with the patient and they were also made aware of online access. Pt also made aware that some labs, such as cultures, will not be resulted during ER visit and follow up with PMD is necessary.     MDM  Number of Diagnoses or Management Options     Amount and/or Complexity of Data Reviewed  Clinical lab tests: ordered and reviewed  Tests in the radiology section of CPT®: reviewed and ordered (CT head - negative acute  CXR - negative acute  XR L femur - fracture through the neck of the left femur)  Tests in the medicine section of CPT®: ordered and reviewed (See EKG procedure note)  Discussion of test results with the performing providers: yes (Dr. Emmanuel (Radiology))  Discuss the patient with other providers: yes (Dr. Mcadams ([facility]))  Independent visualization of images, tracings, or specimens: yes           DIAGNOSIS  Final diagnoses:   Closed fracture of neck of left femur, initial encounter (CMS/[facility])       DISPOSITION  ADMISSION    Discussed treatment plan and reason for admission with pt/family and admitting physician.  Pt/family voiced understanding of the plan for admission for further testing/treatment as needed.         Latest Documented Vital Signs:  As of 12:31  PM  BP- (!) 176/103 HR- 93 Temp- 98.1 °F (36.7 °C) (Tympanic) O2 sat- 99%    --  Documentation assistance provided by yuan Garzon for Dr. Jovany MD.  Information recorded by the scribe was done at my direction and has been verified and validated by me.       Bobby aGrzon  11/16/19 1231       Jared Manzo MD  11/16/19 1230

## 2019-11-17 ENCOUNTER — APPOINTMENT (OUTPATIENT)
Dept: GENERAL RADIOLOGY | Facility: HOSPITAL | Age: 79
End: 2019-11-17

## 2019-11-17 ENCOUNTER — ANESTHESIA (OUTPATIENT)
Dept: PERIOP | Facility: HOSPITAL | Age: 79
End: 2019-11-17

## 2019-11-17 ENCOUNTER — ANESTHESIA EVENT (OUTPATIENT)
Dept: PERIOP | Facility: HOSPITAL | Age: 79
End: 2019-11-17

## 2019-11-17 PROBLEM — S72.002A CLOSED FRACTURE OF NECK OF LEFT FEMUR (HCC): Status: RESOLVED | Noted: 2019-11-16 | Resolved: 2019-11-17

## 2019-11-17 LAB
ANION GAP SERPL CALCULATED.3IONS-SCNC: 16.6 MMOL/L (ref 5–15)
BASOPHILS # BLD AUTO: 0.07 10*3/MM3 (ref 0–0.2)
BASOPHILS NFR BLD AUTO: 0.7 % (ref 0–1.5)
BUN BLD-MCNC: 16 MG/DL (ref 8–23)
BUN/CREAT SERPL: 19.8 (ref 7–25)
CALCIUM SPEC-SCNC: 9 MG/DL (ref 8.6–10.5)
CHLORIDE SERPL-SCNC: 103 MMOL/L (ref 98–107)
CO2 SERPL-SCNC: 22.4 MMOL/L (ref 22–29)
CREAT BLD-MCNC: 0.81 MG/DL (ref 0.76–1.27)
DEPRECATED RDW RBC AUTO: 53.8 FL (ref 37–54)
EOSINOPHIL # BLD AUTO: 0.05 10*3/MM3 (ref 0–0.4)
EOSINOPHIL NFR BLD AUTO: 0.5 % (ref 0.3–6.2)
ERYTHROCYTE [DISTWIDTH] IN BLOOD BY AUTOMATED COUNT: 15.2 % (ref 12.3–15.4)
GFR SERPL CREATININE-BSD FRML MDRD: 92 ML/MIN/1.73
GLUCOSE BLD-MCNC: 151 MG/DL (ref 65–99)
HCT VFR BLD AUTO: 51.6 % (ref 37.5–51)
HGB BLD-MCNC: 17.4 G/DL (ref 13–17.7)
IMM GRANULOCYTES # BLD AUTO: 0.04 10*3/MM3 (ref 0–0.05)
IMM GRANULOCYTES NFR BLD AUTO: 0.4 % (ref 0–0.5)
LYMPHOCYTES # BLD AUTO: 1.1 10*3/MM3 (ref 0.7–3.1)
LYMPHOCYTES NFR BLD AUTO: 11.7 % (ref 19.6–45.3)
MCH RBC QN AUTO: 32.6 PG (ref 26.6–33)
MCHC RBC AUTO-ENTMCNC: 33.7 G/DL (ref 31.5–35.7)
MCV RBC AUTO: 96.6 FL (ref 79–97)
MONOCYTES # BLD AUTO: 0.96 10*3/MM3 (ref 0.1–0.9)
MONOCYTES NFR BLD AUTO: 10.2 % (ref 5–12)
NEUTROPHILS # BLD AUTO: 7.15 10*3/MM3 (ref 1.7–7)
NEUTROPHILS NFR BLD AUTO: 76.5 % (ref 42.7–76)
NRBC BLD AUTO-RTO: 0.1 /100 WBC (ref 0–0.2)
PLATELET # BLD AUTO: 149 10*3/MM3 (ref 140–450)
PMV BLD AUTO: 9.8 FL (ref 6–12)
POTASSIUM BLD-SCNC: 3.9 MMOL/L (ref 3.5–5.2)
RBC # BLD AUTO: 5.34 10*6/MM3 (ref 4.14–5.8)
SODIUM BLD-SCNC: 142 MMOL/L (ref 136–145)
TROPONIN T SERPL-MCNC: <0.01 NG/ML (ref 0–0.03)
TSH SERPL DL<=0.05 MIU/L-ACNC: 2.01 UIU/ML (ref 0.27–4.2)
WBC NRBC COR # BLD: 9.37 10*3/MM3 (ref 3.4–10.8)

## 2019-11-17 PROCEDURE — 25010000002 DEXAMETHASONE PER 1 MG: Performed by: NURSE ANESTHETIST, CERTIFIED REGISTERED

## 2019-11-17 PROCEDURE — 76000 FLUOROSCOPY <1 HR PHYS/QHP: CPT

## 2019-11-17 PROCEDURE — 25010000002 CLONIDINE PER 1 MG: Performed by: ORTHOPAEDIC SURGERY

## 2019-11-17 PROCEDURE — 84443 ASSAY THYROID STIM HORMONE: CPT | Performed by: HOSPITALIST

## 2019-11-17 PROCEDURE — 25010000002 PROPOFOL 10 MG/ML EMULSION: Performed by: NURSE ANESTHETIST, CERTIFIED REGISTERED

## 2019-11-17 PROCEDURE — 25010000002 KETOROLAC TROMETHAMINE PER 15 MG: Performed by: ORTHOPAEDIC SURGERY

## 2019-11-17 PROCEDURE — 25010000002 ROPIVACAINE PER 1 MG: Performed by: ORTHOPAEDIC SURGERY

## 2019-11-17 PROCEDURE — 80048 BASIC METABOLIC PNL TOTAL CA: CPT | Performed by: HOSPITALIST

## 2019-11-17 PROCEDURE — 25010000002 HYDRALAZINE PER 20 MG: Performed by: HOSPITALIST

## 2019-11-17 PROCEDURE — 73501 X-RAY EXAM HIP UNI 1 VIEW: CPT

## 2019-11-17 PROCEDURE — 25010000003 CEFAZOLIN IN DEXTROSE 2-4 GM/100ML-% SOLUTION: Performed by: ORTHOPAEDIC SURGERY

## 2019-11-17 PROCEDURE — 25010000002 ONDANSETRON PER 1 MG: Performed by: HOSPITALIST

## 2019-11-17 PROCEDURE — 25010000002 NEOSTIGMINE PER 0.5 MG: Performed by: NURSE ANESTHETIST, CERTIFIED REGISTERED

## 2019-11-17 PROCEDURE — 25010000002 HYDROMORPHONE PER 4 MG: Performed by: NURSE ANESTHETIST, CERTIFIED REGISTERED

## 2019-11-17 PROCEDURE — 25010000002 FENTANYL CITRATE (PF) 100 MCG/2ML SOLUTION: Performed by: ANESTHESIOLOGY

## 2019-11-17 PROCEDURE — 85025 COMPLETE CBC W/AUTO DIFF WBC: CPT | Performed by: HOSPITALIST

## 2019-11-17 PROCEDURE — 84484 ASSAY OF TROPONIN QUANT: CPT | Performed by: HOSPITALIST

## 2019-11-17 PROCEDURE — 0SRB04A REPLACEMENT OF LEFT HIP JOINT WITH CERAMIC ON POLYETHYLENE SYNTHETIC SUBSTITUTE, UNCEMENTED, OPEN APPROACH: ICD-10-PCS | Performed by: ORTHOPAEDIC SURGERY

## 2019-11-17 PROCEDURE — 25010000002 PHENYLEPHRINE PER 1 ML: Performed by: NURSE ANESTHETIST, CERTIFIED REGISTERED

## 2019-11-17 PROCEDURE — C1776 JOINT DEVICE (IMPLANTABLE): HCPCS | Performed by: ORTHOPAEDIC SURGERY

## 2019-11-17 DEVICE — ACTIS DUOFIX HIP PROSTHESIS (FEMORAL STEM 12/14 TAPER CEMENTLESS SIZE 6 STD COLLAR)  CE
Type: IMPLANTABLE DEVICE | Site: HIP | Status: FUNCTIONAL
Brand: ACTIS

## 2019-11-17 DEVICE — TOTL HIP COP DEPUY 9641334: Type: IMPLANTABLE DEVICE | Site: HIP | Status: FUNCTIONAL

## 2019-11-17 DEVICE — PINNACLE GRIPTION ACETABULAR SHELL SECTOR 54MM OD
Type: IMPLANTABLE DEVICE | Site: HIP | Status: FUNCTIONAL
Brand: PINNACLE GRIPTION

## 2019-11-17 DEVICE — BIOLOX DELTA CERAMIC FEMORAL HEAD +1.5 36MM DIA 12/14 TAPER
Type: IMPLANTABLE DEVICE | Site: HIP | Status: FUNCTIONAL
Brand: BIOLOX DELTA

## 2019-11-17 DEVICE — PINNACLE HIP SOLUTIONS ALTRX POLYETHYLENE ACETABULAR LINER NEUTRAL 36MM ID 54MM OD
Type: IMPLANTABLE DEVICE | Site: HIP | Status: FUNCTIONAL
Brand: PINNACLE ALTRX

## 2019-11-17 DEVICE — SUT FW #2 W/TPR NDL 1/2 CIR 38IN 97CM 26.5MM BLU: Type: IMPLANTABLE DEVICE | Site: HIP | Status: FUNCTIONAL

## 2019-11-17 RX ORDER — DOCUSATE SODIUM 100 MG/1
100 CAPSULE, LIQUID FILLED ORAL 2 TIMES DAILY PRN
Status: DISCONTINUED | OUTPATIENT
Start: 2019-11-17 | End: 2019-11-21 | Stop reason: HOSPADM

## 2019-11-17 RX ORDER — BISACODYL 10 MG
10 SUPPOSITORY, RECTAL RECTAL DAILY PRN
Status: DISCONTINUED | OUTPATIENT
Start: 2019-11-17 | End: 2019-11-21 | Stop reason: HOSPADM

## 2019-11-17 RX ORDER — PROMETHAZINE HYDROCHLORIDE 25 MG/ML
6.25 INJECTION, SOLUTION INTRAMUSCULAR; INTRAVENOUS
Status: DISCONTINUED | OUTPATIENT
Start: 2019-11-17 | End: 2019-11-17 | Stop reason: HOSPADM

## 2019-11-17 RX ORDER — DIPHENHYDRAMINE HCL 25 MG
25 CAPSULE ORAL
Status: DISCONTINUED | OUTPATIENT
Start: 2019-11-17 | End: 2019-11-17 | Stop reason: HOSPADM

## 2019-11-17 RX ORDER — DEXAMETHASONE SODIUM PHOSPHATE 10 MG/ML
INJECTION INTRAMUSCULAR; INTRAVENOUS AS NEEDED
Status: DISCONTINUED | OUTPATIENT
Start: 2019-11-17 | End: 2019-11-17 | Stop reason: SURG

## 2019-11-17 RX ORDER — ACETAMINOPHEN 500 MG
1000 TABLET ORAL ONCE
Status: DISCONTINUED | OUTPATIENT
Start: 2019-11-17 | End: 2019-11-17 | Stop reason: HOSPADM

## 2019-11-17 RX ORDER — SODIUM CHLORIDE 9 MG/ML
100 INJECTION, SOLUTION INTRAVENOUS CONTINUOUS
Status: DISCONTINUED | OUTPATIENT
Start: 2019-11-17 | End: 2019-11-18

## 2019-11-17 RX ORDER — SODIUM CHLORIDE, SODIUM LACTATE, POTASSIUM CHLORIDE, CALCIUM CHLORIDE 600; 310; 30; 20 MG/100ML; MG/100ML; MG/100ML; MG/100ML
9 INJECTION, SOLUTION INTRAVENOUS CONTINUOUS
Status: DISCONTINUED | OUTPATIENT
Start: 2019-11-17 | End: 2019-11-21

## 2019-11-17 RX ORDER — LIDOCAINE HYDROCHLORIDE 10 MG/ML
0.5 INJECTION, SOLUTION EPIDURAL; INFILTRATION; INTRACAUDAL; PERINEURAL ONCE AS NEEDED
Status: DISCONTINUED | OUTPATIENT
Start: 2019-11-17 | End: 2019-11-17 | Stop reason: HOSPADM

## 2019-11-17 RX ORDER — FENTANYL CITRATE 50 UG/ML
50 INJECTION, SOLUTION INTRAMUSCULAR; INTRAVENOUS
Status: DISCONTINUED | OUTPATIENT
Start: 2019-11-17 | End: 2019-11-17 | Stop reason: HOSPADM

## 2019-11-17 RX ORDER — ACETAMINOPHEN 325 MG/1
650 TABLET ORAL ONCE AS NEEDED
Status: DISCONTINUED | OUTPATIENT
Start: 2019-11-17 | End: 2019-11-17 | Stop reason: HOSPADM

## 2019-11-17 RX ORDER — ONDANSETRON 2 MG/ML
4 INJECTION INTRAMUSCULAR; INTRAVENOUS ONCE AS NEEDED
Status: DISCONTINUED | OUTPATIENT
Start: 2019-11-17 | End: 2019-11-17 | Stop reason: HOSPADM

## 2019-11-17 RX ORDER — GLYCOPYRROLATE 0.2 MG/ML
INJECTION INTRAMUSCULAR; INTRAVENOUS AS NEEDED
Status: DISCONTINUED | OUTPATIENT
Start: 2019-11-17 | End: 2019-11-17 | Stop reason: SURG

## 2019-11-17 RX ORDER — FAMOTIDINE 10 MG/ML
20 INJECTION, SOLUTION INTRAVENOUS ONCE
Status: COMPLETED | OUTPATIENT
Start: 2019-11-17 | End: 2019-11-17

## 2019-11-17 RX ORDER — SODIUM CHLORIDE 0.9 % (FLUSH) 0.9 %
3-10 SYRINGE (ML) INJECTION AS NEEDED
Status: DISCONTINUED | OUTPATIENT
Start: 2019-11-17 | End: 2019-11-17 | Stop reason: HOSPADM

## 2019-11-17 RX ORDER — HYDROMORPHONE HYDROCHLORIDE 1 MG/ML
0.25 INJECTION, SOLUTION INTRAMUSCULAR; INTRAVENOUS; SUBCUTANEOUS
Status: DISCONTINUED | OUTPATIENT
Start: 2019-11-17 | End: 2019-11-17 | Stop reason: HOSPADM

## 2019-11-17 RX ORDER — CEFAZOLIN SODIUM 2 G/100ML
2 INJECTION, SOLUTION INTRAVENOUS ONCE
Status: COMPLETED | OUTPATIENT
Start: 2019-11-17 | End: 2019-11-17

## 2019-11-17 RX ORDER — HYDRALAZINE HYDROCHLORIDE 20 MG/ML
5 INJECTION INTRAMUSCULAR; INTRAVENOUS
Status: DISCONTINUED | OUTPATIENT
Start: 2019-11-17 | End: 2019-11-17 | Stop reason: HOSPADM

## 2019-11-17 RX ORDER — PROMETHAZINE HYDROCHLORIDE 25 MG/ML
12.5 INJECTION, SOLUTION INTRAMUSCULAR; INTRAVENOUS ONCE AS NEEDED
Status: DISCONTINUED | OUTPATIENT
Start: 2019-11-17 | End: 2019-11-17 | Stop reason: HOSPADM

## 2019-11-17 RX ORDER — HYDROCODONE BITARTRATE AND ACETAMINOPHEN 7.5; 325 MG/1; MG/1
1 TABLET ORAL ONCE AS NEEDED
Status: DISCONTINUED | OUTPATIENT
Start: 2019-11-17 | End: 2019-11-17 | Stop reason: HOSPADM

## 2019-11-17 RX ORDER — DIPHENHYDRAMINE HYDROCHLORIDE 50 MG/ML
12.5 INJECTION INTRAMUSCULAR; INTRAVENOUS
Status: DISCONTINUED | OUTPATIENT
Start: 2019-11-17 | End: 2019-11-17 | Stop reason: HOSPADM

## 2019-11-17 RX ORDER — PROPOFOL 10 MG/ML
VIAL (ML) INTRAVENOUS AS NEEDED
Status: DISCONTINUED | OUTPATIENT
Start: 2019-11-17 | End: 2019-11-17 | Stop reason: SURG

## 2019-11-17 RX ORDER — NALOXONE HCL 0.4 MG/ML
0.2 VIAL (ML) INJECTION AS NEEDED
Status: DISCONTINUED | OUTPATIENT
Start: 2019-11-17 | End: 2019-11-17 | Stop reason: HOSPADM

## 2019-11-17 RX ORDER — TRAMADOL HYDROCHLORIDE 50 MG/1
50 TABLET ORAL EVERY 6 HOURS PRN
Status: DISCONTINUED | OUTPATIENT
Start: 2019-11-17 | End: 2019-11-21 | Stop reason: HOSPADM

## 2019-11-17 RX ORDER — SODIUM CHLORIDE 0.9 % (FLUSH) 0.9 %
3 SYRINGE (ML) INJECTION EVERY 12 HOURS SCHEDULED
Status: DISCONTINUED | OUTPATIENT
Start: 2019-11-17 | End: 2019-11-17 | Stop reason: HOSPADM

## 2019-11-17 RX ORDER — MEPERIDINE HYDROCHLORIDE 25 MG/ML
12.5 INJECTION INTRAMUSCULAR; INTRAVENOUS; SUBCUTANEOUS
Status: DISCONTINUED | OUTPATIENT
Start: 2019-11-17 | End: 2019-11-17 | Stop reason: HOSPADM

## 2019-11-17 RX ORDER — OXYCODONE AND ACETAMINOPHEN 7.5; 325 MG/1; MG/1
1 TABLET ORAL ONCE AS NEEDED
Status: DISCONTINUED | OUTPATIENT
Start: 2019-11-17 | End: 2019-11-17 | Stop reason: HOSPADM

## 2019-11-17 RX ORDER — FENTANYL CITRATE 50 UG/ML
INJECTION, SOLUTION INTRAMUSCULAR; INTRAVENOUS
Status: COMPLETED
Start: 2019-11-17 | End: 2019-11-17

## 2019-11-17 RX ORDER — TRANEXAMIC ACID 100 MG/ML
INJECTION, SOLUTION INTRAVENOUS AS NEEDED
Status: DISCONTINUED | OUTPATIENT
Start: 2019-11-17 | End: 2019-11-17 | Stop reason: SURG

## 2019-11-17 RX ORDER — HYDROMORPHONE HYDROCHLORIDE 1 MG/ML
INJECTION, SOLUTION INTRAMUSCULAR; INTRAVENOUS; SUBCUTANEOUS
Status: DISPENSED
Start: 2019-11-17 | End: 2019-11-17

## 2019-11-17 RX ORDER — FLUMAZENIL 0.1 MG/ML
0.2 INJECTION INTRAVENOUS AS NEEDED
Status: DISCONTINUED | OUTPATIENT
Start: 2019-11-17 | End: 2019-11-17 | Stop reason: HOSPADM

## 2019-11-17 RX ORDER — PROMETHAZINE HYDROCHLORIDE 25 MG/1
25 SUPPOSITORY RECTAL ONCE AS NEEDED
Status: DISCONTINUED | OUTPATIENT
Start: 2019-11-17 | End: 2019-11-17 | Stop reason: HOSPADM

## 2019-11-17 RX ORDER — OXYCODONE HCL 10 MG/1
10 TABLET, FILM COATED, EXTENDED RELEASE ORAL ONCE
Status: DISCONTINUED | OUTPATIENT
Start: 2019-11-17 | End: 2019-11-17 | Stop reason: HOSPADM

## 2019-11-17 RX ORDER — LIDOCAINE HYDROCHLORIDE 20 MG/ML
INJECTION, SOLUTION INFILTRATION; PERINEURAL AS NEEDED
Status: DISCONTINUED | OUTPATIENT
Start: 2019-11-17 | End: 2019-11-17 | Stop reason: SURG

## 2019-11-17 RX ORDER — HYDROCODONE BITARTRATE AND ACETAMINOPHEN 5; 325 MG/1; MG/1
1 TABLET ORAL EVERY 4 HOURS PRN
Status: DISCONTINUED | OUTPATIENT
Start: 2019-11-17 | End: 2019-11-21 | Stop reason: HOSPADM

## 2019-11-17 RX ORDER — ACETAMINOPHEN 500 MG
1000 TABLET ORAL EVERY 8 HOURS
Status: DISCONTINUED | OUTPATIENT
Start: 2019-11-17 | End: 2019-11-21 | Stop reason: HOSPADM

## 2019-11-17 RX ORDER — CEFAZOLIN SODIUM 2 G/100ML
2 INJECTION, SOLUTION INTRAVENOUS EVERY 8 HOURS
Status: COMPLETED | OUTPATIENT
Start: 2019-11-17 | End: 2019-11-18

## 2019-11-17 RX ORDER — PROMETHAZINE HYDROCHLORIDE 25 MG/1
25 TABLET ORAL ONCE AS NEEDED
Status: DISCONTINUED | OUTPATIENT
Start: 2019-11-17 | End: 2019-11-17 | Stop reason: HOSPADM

## 2019-11-17 RX ORDER — HYDROMORPHONE HCL 110MG/55ML
PATIENT CONTROLLED ANALGESIA SYRINGE INTRAVENOUS AS NEEDED
Status: DISCONTINUED | OUTPATIENT
Start: 2019-11-17 | End: 2019-11-17 | Stop reason: SURG

## 2019-11-17 RX ORDER — BISACODYL 5 MG/1
10 TABLET, DELAYED RELEASE ORAL DAILY PRN
Status: DISCONTINUED | OUTPATIENT
Start: 2019-11-17 | End: 2019-11-21 | Stop reason: HOSPADM

## 2019-11-17 RX ORDER — ROCURONIUM BROMIDE 10 MG/ML
INJECTION, SOLUTION INTRAVENOUS AS NEEDED
Status: DISCONTINUED | OUTPATIENT
Start: 2019-11-17 | End: 2019-11-17 | Stop reason: SURG

## 2019-11-17 RX ORDER — EPHEDRINE SULFATE 50 MG/ML
5 INJECTION, SOLUTION INTRAVENOUS ONCE AS NEEDED
Status: DISCONTINUED | OUTPATIENT
Start: 2019-11-17 | End: 2019-11-17 | Stop reason: HOSPADM

## 2019-11-17 RX ADMIN — METOPROLOL TARTRATE 25 MG: 25 TABLET ORAL at 07:22

## 2019-11-17 RX ADMIN — PHENYLEPHRINE HYDROCHLORIDE 200 MCG: 10 INJECTION INTRAVENOUS at 08:57

## 2019-11-17 RX ADMIN — ONDANSETRON 4 MG: 2 INJECTION INTRAMUSCULAR; INTRAVENOUS at 08:56

## 2019-11-17 RX ADMIN — PHENYLEPHRINE HYDROCHLORIDE 200 MCG: 10 INJECTION INTRAVENOUS at 10:18

## 2019-11-17 RX ADMIN — CEFAZOLIN SODIUM 2 G: 2 INJECTION, SOLUTION INTRAVENOUS at 08:28

## 2019-11-17 RX ADMIN — HYDROMORPHONE HYDROCHLORIDE 0.5 MG: 2 INJECTION INTRAMUSCULAR; INTRAVENOUS; SUBCUTANEOUS at 09:38

## 2019-11-17 RX ADMIN — HYDROCODONE BITARTRATE AND ACETAMINOPHEN 1 TABLET: 5; 325 TABLET ORAL at 19:17

## 2019-11-17 RX ADMIN — LIDOCAINE HYDROCHLORIDE 60 MG: 20 INJECTION, SOLUTION INFILTRATION; PERINEURAL at 08:24

## 2019-11-17 RX ADMIN — PROPOFOL 120 MG: 10 INJECTION, EMULSION INTRAVENOUS at 08:24

## 2019-11-17 RX ADMIN — FAMOTIDINE 20 MG: 10 INJECTION INTRAVENOUS at 07:53

## 2019-11-17 RX ADMIN — NEOSTIGMINE METHYLSULFATE 2 MG: 1 INJECTION INTRAMUSCULAR; INTRAVENOUS; SUBCUTANEOUS at 10:30

## 2019-11-17 RX ADMIN — DEXAMETHASONE SODIUM PHOSPHATE 6 MG: 10 INJECTION INTRAMUSCULAR; INTRAVENOUS at 08:56

## 2019-11-17 RX ADMIN — GLYCOPYRROLATE 0.2 MG: 0.2 INJECTION INTRAMUSCULAR; INTRAVENOUS at 10:40

## 2019-11-17 RX ADMIN — TRANEXAMIC ACID 1000 MG: 100 INJECTION, SOLUTION INTRAVENOUS at 08:40

## 2019-11-17 RX ADMIN — PHENYLEPHRINE HYDROCHLORIDE 200 MCG: 10 INJECTION INTRAVENOUS at 08:45

## 2019-11-17 RX ADMIN — ROCURONIUM BROMIDE 50 MG: 10 INJECTION INTRAVENOUS at 08:24

## 2019-11-17 RX ADMIN — SODIUM CHLORIDE 100 ML/HR: 9 INJECTION, SOLUTION INTRAVENOUS at 16:17

## 2019-11-17 RX ADMIN — PHENYLEPHRINE HYDROCHLORIDE 200 MCG: 10 INJECTION INTRAVENOUS at 09:45

## 2019-11-17 RX ADMIN — GLYCOPYRROLATE 0.3 MG: 0.2 INJECTION INTRAMUSCULAR; INTRAVENOUS at 10:30

## 2019-11-17 RX ADMIN — SODIUM CHLORIDE, POTASSIUM CHLORIDE, SODIUM LACTATE AND CALCIUM CHLORIDE: 600; 310; 30; 20 INJECTION, SOLUTION INTRAVENOUS at 10:24

## 2019-11-17 RX ADMIN — PHENYLEPHRINE HYDROCHLORIDE 200 MCG: 10 INJECTION INTRAVENOUS at 08:34

## 2019-11-17 RX ADMIN — ROPIVACAINE HYDROCHLORIDE: 150 INJECTION, SOLUTION EPIDURAL; INFILTRATION; PERINEURAL at 09:08

## 2019-11-17 RX ADMIN — PHENYLEPHRINE HYDROCHLORIDE 100 MCG: 10 INJECTION INTRAVENOUS at 10:35

## 2019-11-17 RX ADMIN — HYDRALAZINE HYDROCHLORIDE 10 MG: 20 INJECTION INTRAMUSCULAR; INTRAVENOUS at 02:52

## 2019-11-17 RX ADMIN — FAMOTIDINE 20 MG: 20 TABLET, FILM COATED ORAL at 17:17

## 2019-11-17 RX ADMIN — FENTANYL CITRATE 100 MCG: 50 INJECTION INTRAMUSCULAR; INTRAVENOUS at 08:24

## 2019-11-17 RX ADMIN — CEFAZOLIN SODIUM 2 G: 2 INJECTION, SOLUTION INTRAVENOUS at 16:17

## 2019-11-17 RX ADMIN — SODIUM CHLORIDE, POTASSIUM CHLORIDE, SODIUM LACTATE AND CALCIUM CHLORIDE 9 ML/HR: 600; 310; 30; 20 INJECTION, SOLUTION INTRAVENOUS at 07:52

## 2019-11-17 NOTE — PLAN OF CARE
Problem: Patient Care Overview  Goal: Plan of Care Review  Outcome: Ongoing (interventions implemented as appropriate)   11/17/19 0331 11/17/19 1150 11/17/19 1542   Plan of Care Review   Progress no change --  --    OTHER   Outcome Summary --  --  pt to surgery today for Left total hip S/P fall yesterday. pt Alert and answers questions appropriately but having hallucinations. BP still elevated despite medication. pt is incont. did not work with therapy today. combative at times. family remains at bediside. MIRIAN dressing in place. CDI. NVI. no D/C plan set. No pain medication given at this time. educated on the importance of BP monitoring and taking BP medication as prescribed. unable to teach back at this time. will cont to monitor.    Coping/Psychosocial   Plan of Care Reviewed With --  patient --      Goal: Individualization and Mutuality  Outcome: Ongoing (interventions implemented as appropriate)    Goal: Discharge Needs Assessment  Outcome: Ongoing (interventions implemented as appropriate)    Goal: Interprofessional Rounds/Family Conf  Outcome: Ongoing (interventions implemented as appropriate)      Problem: Fall Risk (Adult)  Goal: Identify Related Risk Factors and Signs and Symptoms  Outcome: Ongoing (interventions implemented as appropriate)    Goal: Absence of Fall  Outcome: Ongoing (interventions implemented as appropriate)      Problem: Skin Injury Risk (Adult)  Goal: Identify Related Risk Factors and Signs and Symptoms  Outcome: Ongoing (interventions implemented as appropriate)    Goal: Skin Health and Integrity  Outcome: Ongoing (interventions implemented as appropriate)      Problem: Fracture Orthopaedic (Adult)  Goal: Signs and Symptoms of Listed Potential Problems Will be Absent, Minimized or Managed (Fracture Orthopaedic)  Outcome: Ongoing (interventions implemented as appropriate)

## 2019-11-17 NOTE — PROGRESS NOTES
"DAILY PROGRESS NOTE  [facility]    Patient Identification:  Name: Corey Lawson  Age: 79 y.o.  Sex: male  :  1940  MRN: [id number]         Primary Care Physician: Provider, No Known    Subjective:  Interval History:He is confused.    Objective:    Scheduled Meds:  acetaminophen 1,000 mg Oral Q8H   [START ON 2019] apixaban 2.5 mg Oral Q12H   ceFAZolin 2 g Intravenous Q8H   famotidine 20 mg Oral BID AC   HYDROmorphone PF      influenza vaccine 0.5 mL Intramuscular Once   metoprolol tartrate 25 mg Oral Q12H   sodium chloride 10 mL Intravenous Q12H     Continuous Infusions:  lactated ringers 9 mL/hr Last Rate: 9 mL/hr (19 0752)   sodium chloride 125 mL/hr Last Rate: 125 mL/hr (19 2345)   sodium chloride 100 mL/hr        Vital signs in last 24 hours:  Temp:  [97.4 °F (36.3 °C)-98.4 °F (36.9 °C)] 97.8 °F (36.6 °C)  Heart Rate:  [] 73  Resp:  [15-18] 16  BP: (140-177)/() 157/91    Intake/Output:    Intake/Output Summary (Last 24 hours) at 2019 1248  Last data filed at 2019 1135  Gross per 24 hour   Intake 3356 ml   Output 250 ml   Net 3106 ml       Exam:  /91 (BP Location: Right arm, Patient Position: Lying)   Pulse 73   Temp 97.8 °F (36.6 °C) (Oral)   Resp 16   Ht 180 cm (70.87\")   Wt 73.6 kg (162 lb 4.1 oz)   SpO2 99%   BMI 22.72 kg/m²     General Appearance:    Confused, cooperative, no distress   Head:    Normocephalic, without obvious abnormality, atraumatic   Eyes:       Throat:   Lips, tongue, gums normal   Neck:   Supple, symmetrical, trachea midline, no JVD   Lungs:     Clear to auscultation bilaterally, respirations unlabored   Chest Wall:    No tenderness or deformity    Heart:    Regular rate and rhythm, S1 and S2 normal, no murmur,no  Rub or gallop   Abdomen:     Soft, non-tender, bowel sounds active, no masses, no organomegaly    Extremities:   Extremities normal, atraumatic, no cyanosis or edema   Pulses:      Skin:   Skin is warm " and dry,  no rashes or palpable lesions   Neurologic:   no focal deficits noted, confused      Lab Results (last 72 hours)     Procedure Component Value Units Date/Time    TSH [[id number]]  (Normal) Collected:  11/17/19 0330    Specimen:  Blood Updated:  11/17/19 0431     TSH 2.010 uIU/mL     Troponin [[id number]]  (Normal) Collected:  11/17/19 0330    Specimen:  Blood Updated:  11/17/19 0431     Troponin T <0.010 ng/mL     Narrative:       Troponin T Reference Range:  <= 0.03 ng/mL-   Negative for AMI  >0.03 ng/mL-     Abnormal for myocardial necrosis.  Clinicians would have to utilize clinical acumen, EKG, Troponin and serial changes to determine if it is an Acute Myocardial Infarction or myocardial injury due to an underlying chronic condition.     Basic Metabolic Panel [[id number]]  (Abnormal) Collected:  11/17/19 0330    Specimen:  Blood Updated:  11/17/19 0427     Glucose 151 mg/dL      BUN 16 mg/dL      Creatinine 0.81 mg/dL      Sodium 142 mmol/L      Potassium 3.9 mmol/L      Chloride 103 mmol/L      CO2 22.4 mmol/L      Calcium 9.0 mg/dL      eGFR Non African Amer 92 mL/min/1.73      BUN/Creatinine Ratio 19.8     Anion Gap 16.6 mmol/L     Narrative:       GFR Normal >60  Chronic Kidney Disease <60  Kidney Failure <15    CBC Auto Differential [[id number]]  (Abnormal) Collected:  11/17/19 0330    Specimen:  Blood Updated:  11/17/19 0402     WBC 9.37 10*3/mm3      RBC 5.34 10*6/mm3      Hemoglobin 17.4 g/dL      Hematocrit 51.6 %      MCV 96.6 fL      MCH 32.6 pg      MCHC 33.7 g/dL      RDW 15.2 %      RDW-SD 53.8 fl      MPV 9.8 fL      Platelets 149 10*3/mm3      Neutrophil % 76.5 %      Lymphocyte % 11.7 %      Monocyte % 10.2 %      Eosinophil % 0.5 %      Basophil % 0.7 %      Immature Grans % 0.4 %      Neutrophils, Absolute 7.15 10*3/mm3      Lymphocytes, Absolute 1.10 10*3/mm3      Monocytes, Absolute 0.96 10*3/mm3      Eosinophils, Absolute 0.05 10*3/mm3      Basophils, Absolute 0.07 10*3/mm3       Immature Grans, Absolute 0.04 10*3/mm3      nRBC 0.1 /100 WBC     Urinalysis, Microscopic Only - Urine, Clean Catch [[id number]]  (Abnormal) Collected:  11/16/19 1916    Specimen:  Urine, Clean Catch Updated:  11/16/19 2014     RBC, UA 3-5 /HPF      WBC, UA 3-5 /HPF      Bacteria, UA 1+ /HPF      Squamous Epithelial Cells, UA 0-2 /HPF      Transitional Epithelial Cells, UA 0-2 /HPF      Hyaline Casts, UA 0-2 /LPF      Calcium Oxalate Crystals, UA Small/1+ /HPF      Mucus, UA Moderate/2+ /HPF      Methodology Manual Light Microscopy    Myoglobin Screen, Urine - Urine, Clean Catch [[id number]]  (Abnormal) Collected:  11/16/19 1933    Specimen:  Urine, Clean Catch Updated:  11/16/19 1959     Myoglobin, Qualitative Positive    Narrative:       Due to the similarities in the chemical properties of Hemoglobin and and Myoglobin, the presence of either will yield a positive Myoglobin Screen.    Urinalysis With Microscopic If Indicated (No Culture) - Urine, Clean Catch [[id number]]  (Abnormal) Collected:  11/16/19 1916    Specimen:  Urine, Clean Catch Updated:  11/16/19 1958     Color, UA [place]     Comment: Any Substance that causes an abnormal urine color can alter the accuracy of the chemical reactions.        Appearance, UA Clear     pH, UA 5.5     Specific Gravity, UA >=1.030     Glucose, UA Negative     Ketones, UA 40 mg/dL (2+)     Bilirubin, UA Negative     Blood, UA Trace     Protein, UA >=300 mg/dL (3+)     Leuk Esterase, UA Trace     Nitrite, UA Positive     Urobilinogen, UA 1.0 E.U./dL    aPTT [[id number]]  (Normal) Collected:  11/16/19 1521    Specimen:  Blood from Arm, Right Updated:  11/16/19 1910     PTT 30.9 seconds     Protime-INR [[id number]]  (Normal) Collected:  11/16/19 1521    Specimen:  Blood from Arm, Right Updated:  11/16/19 1910     Protime 13.2 Seconds      INR 1.03    Troponin [[id number]]  (Normal) Collected:  11/16/19 1521    Specimen:  Blood Updated:  11/16/19 1603     Troponin T <0.010 ng/mL      Narrative:       Troponin T Reference Range:  <= 0.03 ng/mL-   Negative for AMI  >0.03 ng/mL-     Abnormal for myocardial necrosis.  Clinicians would have to utilize clinical acumen, EKG, Troponin and serial changes to determine if it is an Acute Myocardial Infarction or myocardial injury due to an underlying chronic condition.     Comprehensive Metabolic Panel [[id number]]  (Abnormal) Collected:  11/16/19 0940    Specimen:  Blood Updated:  11/16/19 1016     Glucose 113 mg/dL      BUN 12 mg/dL      Creatinine 0.89 mg/dL      Sodium 141 mmol/L      Potassium 3.6 mmol/L      Chloride 97 mmol/L      CO2 28.0 mmol/L      Calcium 9.5 mg/dL      Total Protein 7.2 g/dL      Albumin 4.10 g/dL      ALT (SGPT) 12 U/L      AST (SGOT) 22 U/L      Alkaline Phosphatase 87 U/L      Total Bilirubin 1.7 mg/dL      eGFR Non African Amer 83 mL/min/1.73      Globulin 3.1 gm/dL      A/G Ratio 1.3 g/dL      BUN/Creatinine Ratio 13.5     Anion Gap 16.0 mmol/L     Narrative:       GFR Normal >60  Chronic Kidney Disease <60  Kidney Failure <15    Troponin [[id number]]  (Normal) Collected:  11/16/19 0940    Specimen:  Blood Updated:  11/16/19 1016     Troponin T <0.010 ng/mL     Narrative:       Troponin T Reference Range:  <= 0.03 ng/mL-   Negative for AMI  >0.03 ng/mL-     Abnormal for myocardial necrosis.  Clinicians would have to utilize clinical acumen, EKG, Troponin and serial changes to determine if it is an Acute Myocardial Infarction or myocardial injury due to an underlying chronic condition.     CK [[id number]]  (Normal) Collected:  11/16/19 0940    Specimen:  Blood Updated:  11/16/19 1016     Creatine Kinase 127 U/L     CBC & Differential [[id number]] Collected:  11/16/19 0940    Specimen:  Blood Updated:  11/16/19 0959    Narrative:       The following orders were created for panel order CBC & Differential.  Procedure                               Abnormality         Status                     ---------                                -----------         ------                     CBC Auto Differential[[id number]]        Abnormal            Final result                 Please view results for these tests on the individual orders.    CBC Auto Differential [[id number]]  (Abnormal) Collected:  11/16/19 0940    Specimen:  Blood Updated:  11/16/19 0959     WBC 9.04 10*3/mm3      RBC 6.00 10*6/mm3      Hemoglobin 19.3 g/dL      Hematocrit 58.1 %      MCV 96.8 fL      MCH 32.2 pg      MCHC 33.2 g/dL      RDW 16.2 %      RDW-SD 54.3 fl      MPV 9.9 fL      Platelets 145 10*3/mm3      Neutrophil % 83.6 %      Lymphocyte % 7.7 %      Monocyte % 7.3 %      Eosinophil % 0.2 %      Basophil % 0.6 %      Immature Grans % 0.6 %      Neutrophils, Absolute 7.56 10*3/mm3      Lymphocytes, Absolute 0.70 10*3/mm3      Monocytes, Absolute 0.66 10*3/mm3      Eosinophils, Absolute 0.02 10*3/mm3      Basophils, Absolute 0.05 10*3/mm3      Immature Grans, Absolute 0.05 10*3/mm3      nRBC 0.0 /100 WBC         Data Review:  Results from last 7 days   Lab Units 11/17/19  0330 11/16/19  0940   SODIUM mmol/L 142 141   POTASSIUM mmol/L 3.9 3.6   CHLORIDE mmol/L 103 97*   CO2 mmol/L 22.4 28.0   BUN mg/dL 16 12   CREATININE mg/dL 0.81 0.89   GLUCOSE mg/dL 151* 113*   CALCIUM mg/dL 9.0 9.5     Results from last 7 days   Lab Units 11/17/19  0330 11/16/19  0940   WBC 10*3/mm3 9.37 9.04   HEMOGLOBIN g/dL 17.4 19.3*   HEMATOCRIT % 51.6* 58.1*   PLATELETS 10*3/mm3 149 145     Results from last 7 days   Lab Units 11/17/19  0330   TSH uIU/mL 2.010         Lab Results   Lab Value Date/Time    TROPONINT <0.010 11/17/2019 0330    TROPONINT <0.010 11/16/2019 1521    TROPONINT <0.010 11/16/2019 0940         Results from last 7 days   Lab Units 11/16/19  0940   ALK PHOS U/L 87   BILIRUBIN mg/dL 1.7*   ALT (SGPT) U/L 12   AST (SGOT) U/L 22     Results from last 7 days   Lab Units 11/17/19  0330   TSH uIU/mL 2.010         No results found for: POCGLU  Results from last 7 days   Lab Units  11/16/19  1521   INR  1.03       Past Medical History:   Diagnosis Date   • GERD (gastroesophageal reflux disease)    • Hyperlipidemia    • Hypertension        Assessment:  Active Hospital Problems    Diagnosis  POA   • Hypertension [I10]  Yes   • Hyperlipidemia [E78.5]  Unknown   • GERD (gastroesophageal reflux disease) [K21.9]  Unknown   • Chest pain, atypical [R07.89]  Unknown   • Abnormal EKG [R94.31]  Yes      Resolved Hospital Problems    Diagnosis Date Resolved POA   • **Closed fracture of neck of left femur (CMS/HCC) [S72.002A] 11/17/2019 Yes       Plan:  Continue post op care. Follow lab.    Quang Mcadams MD  11/17/2019  12:48 PM

## 2019-11-17 NOTE — OP NOTE
Operative  Note    Patient: Corey Lawson  YOB: 1940    Medical Record Number: [id number]    Attending Physician: Quang Mcadams MD    Date of Service: 11/17/2019     Pre-op Diagnosis:   Closed fracture of neck of left femur    Post-Op Diagnosis Codes:  Closed fracture of neck of left femur (CMS/[facility]) [S72.002A]    PROCEDURE PERFORMED: LEFT TOTAL HIP ARTHROPLASTY ANTERIOR WITH HANA TABLE by utilizing a Direct anterior approach with  Depuy   [place] Gription Acetabular  Shell Sector with  holes size 54 mm outer diameter Neutral ALTRX  Polyethylene acetabular  liner- 36 mm internal diameter,   Actis Duo fix Cementless  Standard Collar femoral stem size # 6 with a   Delta ceramic femoral head- 36 mm outer diameter, + 1.5 neck length by utilizing a [place] table (Lost My Name).     Implant Name Type Inv. Item Serial No.  Lot No. LRB No. Used   SUT FW #2 W/TPR NDL 1/2 CIR 38IN 97CM 26.5MM AC - [id number] Implant SUT FW #2 W/TPR NDL 1/2 CIR 38IN 97CM 26.5MM AC  [id number] Left 1   SUT FW #2 W/TPR NDL 1/2 CIR 38IN 97CM 26.5MM AC - [id number] Implant SUT FW #2 W/TPR NDL 1/2 CIR 38IN 97CM 26.5MM AC  ARTHREX [id number] Left 1   LINER ACET ALTRX PINN NTRL [id number] - [id number] Implant LINER ACET ALTRX PINN NTRL [id number]  DEPUY [id number] Left 1   CUP ACET PINN SECTOR W GRIPTN 54MM - [id number] Implant CUP ACET PINN SECTOR W GRIPTN 54MM  DEPUY [id number] Left 1   STEM FEM ACTIS COLR CMTLS STD OFFST 12/14TPR SZ6 - [id number] Implant STEM FEM ACTIS COLR CMTLS STD OFFST 12/14TPR SZ6  DEPUY SYNTHES [id number] Left 1   HD FEM BIOLOXDELTA/ART CERAM 36MM PLS1.5 - [id number] Implant HD FEM BIOLOXDELTA/ART CERAM 36MM PLS1.5  DEPUY [id number] Left 1       SURGEON: Dary Reyes MD     ASSISTANT: Zeyad Bond MD Fellow    ANESTHESIA:  General  Anesthesiologist: Jose Arellano MD  CRNA: Suri Coombs CRNA     Estimated Blood Loss: 250 mL    Specimens: * No orders in the log *    COMPLICATIONS: Nil.     DRAINS:       INDICATIONS: The patient is a 79 y.o. male who presented to   [facility] following a history of fall from standing height.  She was evaluated in the emergency room and was diagnosed with a left hip fracture.  Secondary to multiple medical comorbidities.  The patient has been admitted to the hospital internist.  As I was on call for the emergency room I was notified regarding the injury and for further evaluation and management of the hip fracture.      The medical history was reviewed. The patient was indicated for a  total hip arthroplasty. Likely risks and benefits of the procedure including, but not limited to infection, DVT, pulmonary embolism, leg length discrepancy, recurrent dislocation, possibility of injury to nerves or vessels, and periprosthetic fractures, Possibility of medical complications including but not limited to stroke, heart attack have been discussed in detail. Despite the risks involved, the patient elected to proceed and informed consent was obtained. The patient was seen in the preoperative holding area, and the  operative site was marked.     He has a history of stroke, reported foot drop on left side and stayed home for 3 days with the hip fracture.     DESCRIPTION OF PROCEDURE: The patient has been transferred to [facility] Operating Room.   Preoperative antibiotics in the form of  Kefzol was given intravenously prior to the incision.   After achieving adequate general anesthesia, the patient was transferred onto the [place] table. All bony prominences were padded adequately.   The operative hip was prepped and draped in the usual sterile fashion.   Surgical timeout was done. Correct patient, surgical side and site were identified.  Tranexamic acid was given intravenously at the time of skin incision.    A skin incision was made overlying the anterolateral aspect of the  hip for about 10 cm from just below and lateral to the anterior/superior iliac spine. Skin and  subcutaneous tissue were incised and the deep fascia was incised in line with the skin incision overlying the tensor fascia sameer muscle. The tensor muscle was retracted laterally and interval between the sartorius and the rectus femoris medially and the tensor fascia sameer muscle was developed. The lateral circumflex femoral vessels were identified. They were clamped, cut, and ligated. Unnamed deep fascia was incised. Capsule of the hip joint was identified. Retractors were placed extracapsularly.     The capsule was incised in a L-shaped manner and the anterior capsule was tagged with FiberWire.  Followed by this, the retractors were placed intracapsularly.   There was a hemarthrosis. The femoral neck fracture was identified.  Appropriate capsular releases were done along the inferior and  medial neck and along the saddle of the femoral neck. The femoral neck osteotomy was done distal to the fracture site utilizing an oscillating saw, femoral head was extracted without any difficulty. All bony debris was cleared.     The acetabular cavity was inspected and exposed appropriately by placing retractors taking care to protect the anterior neurovascular structures. . The labrum was excised, pulvinar was excised from the cotyloid fossa. Acetabular cavity was progressively reamed, maintaining the correct inclination and version under image intensifier control. Adequate medialization was obtained. Adequate fit was found to be obtained at reamer size 54 .  The  [place] Gription  Acetabular Shell Sector with  holes  54 mm was seated into position. It was found to be seated satisfactorily with adequate inclination and anteversion. There was good stability. Followed by this, a polyethylene liner was seated into position, checked for stability. This was a 36 mm internal diameter,  highly cross linked neutral 0° lip liner.     The periarticular tissue was infiltrated with local anaesthetic injection which consisted of Naropin,  clonidine and ketorolac mixture.     Attention was then directed to the proximal femur. The proximal femur was delivered by utilizing a trochanteric hook placed just distal to the vastus tubercle and proximal to the gluteus jame tendon. The leg was then externally rotated with the gross traction released and  hyperextension, adduction was done using the [place] table spar. The mechanical lift on the table was utilized to support the femur.  Appropriate capsular releases were made to expose the medial slope of the greater trochanter and the proximal femur was delivered. The femoral canal was entered by removing the lateral femoral neck with a box chisel followed by serial broaching. Adequate fit was found to be obtained with a size 6 broach. A trial reduction was performed. Leg lengths and offset were found to be satisfactory under image intensifier on comparison with the opposite hip under image intensifier. Reduction was found to be satisfactory and there was good stability with range of motion of the hip in internal and external rotation. Having been satisfied with the trials, the hip joint was dislocated.     The femoral  trial broach was removed and  Actis Duo fix Cementless  Standard Collar femoral stem size # 6 was seated into position. This was found to be satisfactorily seated with good stability.      The delta ceramic femoral head 36 mm +1.5 mm neck length was seated onto the trunnion and impacted. Followed by this, the hip joint was reduced. Reduction was found to be satisfactory and image confirmed leg length, offset , implant position and stem fill of the femoral canal.  There were no iatrogenic fractures. Hip joint was thoroughly irrigated with saline. Soft tissue hemostasis was secured. The sponge count and needle count was correct. The FiberWire sutures was tagged to the anterior capsular flaps and they were tied to each other. The incision was closed in layers with vicryl and monocryl sutures and  the patient was transferred to the recovery room in a stable condition.     The patient tolerated the procedure well. There were no complications. The patient had adequate distal pulses and good capillary refill.     The patient will be mobilized with physical therapy.   Antibiotic prophylaxis  in the form of Kefzol postoperatively two more doses will be given in the first 24 hours.   The  patient will be started on DVT prophylaxis with  Aspirin 81 mg twice daily starting on postoperative day #1.    I discussed the satisfactory performance of the procedure with the patient's family and discussed with them the postoperative management.    Dary Reyes MD     Date: 11/17/2019  Time: 11:05 AM    cc: Provider, No Known; MD Pema Chirinos Lyle E, MD

## 2019-11-17 NOTE — ANESTHESIA POSTPROCEDURE EVALUATION
"Patient: Corey Lawson    Procedure Summary     Date:  11/17/19 Room / Location:  [facility] OR [address] [place] [address] MAIN OR    Anesthesia Start:  0820 Anesthesia Stop:  1052    Procedure:  TOTAL HIP ARTHROPLASTY ANTERIOR WITH HANA TABLE (Left Hip) Diagnosis:       Closed fracture of neck of left femur (CMS/HCC)      (Closed fracture of neck of left femur)    Surgeon:  Dary Reyes MD Provider:  Jose Arellano MD    Anesthesia Type:  general ASA Status:  3          Anesthesia Type: general  Last vitals  BP   157/91 (11/17/19 1150)   Temp   36.6 °C (97.8 °F) (11/17/19 1150)   Pulse   73 (11/17/19 1150)   Resp   16 (11/17/19 1150)     SpO2   99 % (11/17/19 1150)     Post Anesthesia Care and Evaluation    Patient location during evaluation: bedside  Patient participation: complete - patient participated  Level of consciousness: awake and alert  Pain management: adequate  Airway patency: patent  Anesthetic complications: No anesthetic complications    Cardiovascular status: acceptable  Respiratory status: acceptable  Hydration status: acceptable    Comments: /91 (BP Location: Right arm, Patient Position: Lying)   Pulse 73   Temp 36.6 °C (97.8 °F) (Oral)   Resp 16   Ht 180 cm (70.87\")   Wt 73.6 kg (162 lb 4.1 oz)   SpO2 99%   BMI 22.72 kg/m²       "

## 2019-11-17 NOTE — ANESTHESIA PREPROCEDURE EVALUATION
Anesthesia Evaluation     Patient summary reviewed and Nursing notes reviewed   no history of anesthetic complications:  NPO Solid Status: > 6 hours             Airway   Mallampati: III  TM distance: >3 FB  Neck ROM: full  No difficulty expected  Dental    (+) edentulous    Pulmonary - normal exam   (+) a smoker Former,   Cardiovascular - normal exam    ECG reviewed    (+) hypertension, past MI (2013) ,   (-) angina      Neuro/Psych    ROS Comment: Head CT  NEGATIVE  GI/Hepatic/Renal/Endo    (+)  GERD,      Musculoskeletal     Abdominal    Substance History      OB/GYN          Other                      Anesthesia Plan    ASA 3     general       Anesthetic plan, all risks, benefits, and alternatives have been provided, discussed and informed consent has been obtained with: patient.

## 2019-11-17 NOTE — ANESTHESIA PROCEDURE NOTES
Airway  Urgency: elective    Date/Time: 11/17/2019 8:27 AM  Airway not difficult    General Information and Staff    Patient location during procedure: OR  CRNA: Suri Coombs CRNA    Indications and Patient Condition  Indications for airway management: airway protection    Preoxygenated: yes  Mask difficulty assessment: 1 - vent by mask    Final Airway Details  Final airway type: endotracheal airway      Successful airway: ETT  Cuffed: yes   Successful intubation technique: direct laryngoscopy  Endotracheal tube insertion site: oral  Blade: Iván  Blade size: 4  ETT size (mm): 7.5  Cormack-Lehane Classification: grade I - full view of glottis  Placement verified by: chest auscultation and capnometry   Cuff volume (mL): 5  Measured from: lips  ETT/EBT  to lips (cm): 21  Number of attempts at approach: 1  Assessment: lips, teeth, and gum same as pre-op and atraumatic intubation    Additional Comments  Smooth IV induction. Trachea intubated. Cuff up. Dentition intact without injury.

## 2019-11-17 NOTE — PLAN OF CARE
Problem: Patient Care Overview  Goal: Plan of Care Review  Outcome: Ongoing (interventions implemented as appropriate)   11/17/19 0331   Plan of Care Review   Progress no change   OTHER   Outcome Summary PATIENT ALERT BUT CONTINUES TO HALLUCINATE. INCREASE BLOOD PRESSURE . HYDRALAZINE GIVEN X1. INCONTI. BED ALARM IN USE . ZERO COMPLAIN OF PAIN. BED REST . NPO . POSSIBLE SURGERY TODAY. EDUCATION REINFORCED ON BP MONITORING.    Coping/Psychosocial   Plan of Care Reviewed With patient     Goal: Individualization and Mutuality  Outcome: Ongoing (interventions implemented as appropriate)    Goal: Discharge Needs Assessment  Outcome: Ongoing (interventions implemented as appropriate)      Problem: Fall Risk (Adult)  Goal: Identify Related Risk Factors and Signs and Symptoms  Outcome: Outcome(s) achieved Date Met: 11/17/19    Goal: Absence of Fall  Outcome: Ongoing (interventions implemented as appropriate)      Problem: Skin Injury Risk (Adult)  Goal: Identify Related Risk Factors and Signs and Symptoms  Outcome: Outcome(s) achieved Date Met: 11/17/19    Goal: Skin Health and Integrity  Outcome: Ongoing (interventions implemented as appropriate)

## 2019-11-18 LAB
ANION GAP SERPL CALCULATED.3IONS-SCNC: 10.1 MMOL/L (ref 5–15)
BASOPHILS # BLD AUTO: 0.02 10*3/MM3 (ref 0–0.2)
BASOPHILS NFR BLD AUTO: 0.2 % (ref 0–1.5)
BUN BLD-MCNC: 18 MG/DL (ref 8–23)
BUN/CREAT SERPL: 24.7 (ref 7–25)
CALCIUM SPEC-SCNC: 8.6 MG/DL (ref 8.6–10.5)
CHLORIDE SERPL-SCNC: 103 MMOL/L (ref 98–107)
CO2 SERPL-SCNC: 25.9 MMOL/L (ref 22–29)
CREAT BLD-MCNC: 0.73 MG/DL (ref 0.76–1.27)
DEPRECATED RDW RBC AUTO: 51.5 FL (ref 37–54)
EOSINOPHIL # BLD AUTO: 0.01 10*3/MM3 (ref 0–0.4)
EOSINOPHIL NFR BLD AUTO: 0.1 % (ref 0.3–6.2)
ERYTHROCYTE [DISTWIDTH] IN BLOOD BY AUTOMATED COUNT: 14.8 % (ref 12.3–15.4)
GFR SERPL CREATININE-BSD FRML MDRD: 104 ML/MIN/1.73
GLUCOSE BLD-MCNC: 109 MG/DL (ref 65–99)
HCT VFR BLD AUTO: 42.5 % (ref 37.5–51)
HGB BLD-MCNC: 14.9 G/DL (ref 13–17.7)
IMM GRANULOCYTES # BLD AUTO: 0.04 10*3/MM3 (ref 0–0.05)
IMM GRANULOCYTES NFR BLD AUTO: 0.4 % (ref 0–0.5)
LYMPHOCYTES # BLD AUTO: 0.96 10*3/MM3 (ref 0.7–3.1)
LYMPHOCYTES NFR BLD AUTO: 10.2 % (ref 19.6–45.3)
MCH RBC QN AUTO: 33.3 PG (ref 26.6–33)
MCHC RBC AUTO-ENTMCNC: 35.1 G/DL (ref 31.5–35.7)
MCV RBC AUTO: 95.1 FL (ref 79–97)
MONOCYTES # BLD AUTO: 1.08 10*3/MM3 (ref 0.1–0.9)
MONOCYTES NFR BLD AUTO: 11.5 % (ref 5–12)
NEUTROPHILS # BLD AUTO: 7.26 10*3/MM3 (ref 1.7–7)
NEUTROPHILS NFR BLD AUTO: 77.6 % (ref 42.7–76)
NRBC BLD AUTO-RTO: 0 /100 WBC (ref 0–0.2)
PLATELET # BLD AUTO: 150 10*3/MM3 (ref 140–450)
PMV BLD AUTO: 9.9 FL (ref 6–12)
POTASSIUM BLD-SCNC: 3.6 MMOL/L (ref 3.5–5.2)
RBC # BLD AUTO: 4.47 10*6/MM3 (ref 4.14–5.8)
SODIUM BLD-SCNC: 139 MMOL/L (ref 136–145)
WBC NRBC COR # BLD: 9.37 10*3/MM3 (ref 3.4–10.8)

## 2019-11-18 PROCEDURE — 80048 BASIC METABOLIC PNL TOTAL CA: CPT | Performed by: ORTHOPAEDIC SURGERY

## 2019-11-18 PROCEDURE — 85025 COMPLETE CBC W/AUTO DIFF WBC: CPT | Performed by: ORTHOPAEDIC SURGERY

## 2019-11-18 PROCEDURE — 97162 PT EVAL MOD COMPLEX 30 MIN: CPT

## 2019-11-18 PROCEDURE — 97110 THERAPEUTIC EXERCISES: CPT

## 2019-11-18 PROCEDURE — 25010000003 CEFAZOLIN IN DEXTROSE 2-4 GM/100ML-% SOLUTION: Performed by: ORTHOPAEDIC SURGERY

## 2019-11-18 RX ADMIN — ACETAMINOPHEN 1000 MG: 500 TABLET, FILM COATED ORAL at 20:46

## 2019-11-18 RX ADMIN — FAMOTIDINE 20 MG: 20 TABLET, FILM COATED ORAL at 09:40

## 2019-11-18 RX ADMIN — TRAMADOL HYDROCHLORIDE 50 MG: 50 TABLET, FILM COATED ORAL at 18:40

## 2019-11-18 RX ADMIN — FAMOTIDINE 20 MG: 20 TABLET, FILM COATED ORAL at 18:41

## 2019-11-18 RX ADMIN — TRAMADOL HYDROCHLORIDE 50 MG: 50 TABLET, FILM COATED ORAL at 12:29

## 2019-11-18 RX ADMIN — METOPROLOL TARTRATE 25 MG: 25 TABLET ORAL at 20:46

## 2019-11-18 RX ADMIN — APIXABAN 2.5 MG: 2.5 TABLET, FILM COATED ORAL at 09:40

## 2019-11-18 RX ADMIN — ACETAMINOPHEN 1000 MG: 500 TABLET, FILM COATED ORAL at 12:29

## 2019-11-18 RX ADMIN — CEFAZOLIN SODIUM 2 G: 2 INJECTION, SOLUTION INTRAVENOUS at 01:00

## 2019-11-18 RX ADMIN — APIXABAN 2.5 MG: 2.5 TABLET, FILM COATED ORAL at 20:46

## 2019-11-18 RX ADMIN — METOPROLOL TARTRATE 25 MG: 25 TABLET ORAL at 00:01

## 2019-11-18 RX ADMIN — METOPROLOL TARTRATE 25 MG: 25 TABLET ORAL at 09:40

## 2019-11-18 NOTE — PROGRESS NOTES
Patient: Corey Lawson  YOB: 1940     Date of Admission: 11/16/2019  9:15 AM Medical Record Number: [id number]     Attending Physician: Quang Mcadams MD    Procedure(s):  TOTAL HIP ARTHROPLASTY ANTERIOR WITH HANA TABLE Post Operative Day Number: 1    Subjective : No new orthopaedic complaints     Pain Relief: some relief with present medication.     Systemic Complaints: Confused and combative overnight. Better after son came to spend the night.  Vitals:    11/17/19 1532 11/17/19 1951 11/17/19 2344 11/18/19 0349   BP: 152/87 174/97 160/92 116/65   BP Location: Right arm Left arm Right arm Right arm   Patient Position: Lying Sitting Lying Lying   Pulse: 86 90 84 73   Resp: 16 16 16 16   Temp: 98.1 °F (36.7 °C) 97.5 °F (36.4 °C) 97.1 °F (36.2 °C) 98.7 °F (37.1 °C)   TempSrc: Oral Oral Oral Oral   SpO2: 96% 97% 98% 98%   Weight:       Height:           Physical Exam: 79 y.o. male    General Appearance:       Alert, cooperative, in no acute distress                  Extremities:    Dressing Clean, Dry and Intact         Incision healthy without signs or symptoms of infections         No clinical sign of DVT        Able to do good movements of digits    Pulses:   Pulses palpable and equal bilaterally           Diagnostic Tests:     Results from last 7 days   Lab Units 11/17/19  0330 11/16/19  0940   WBC 10*3/mm3 9.37 9.04   HEMOGLOBIN g/dL 17.4 19.3*   HEMATOCRIT % 51.6* 58.1*   PLATELETS 10*3/mm3 149 145     Results from last 7 days   Lab Units 11/17/19  0330 11/16/19  0940   SODIUM mmol/L 142 141   POTASSIUM mmol/L 3.9 3.6   CHLORIDE mmol/L 103 97*   CO2 mmol/L 22.4 28.0   BUN mg/dL 16 12   CREATININE mg/dL 0.81 0.89   GLUCOSE mg/dL 151* 113*   CALCIUM mg/dL 9.0 9.5     Results from last 7 days   Lab Units 11/16/19  1521   INR  1.03   APTT seconds 30.9     No results found for: CRP  No results found for: SEDRATE  No results found for: URICACID  No results found for: CRYSTAL  Microbiology Results (last  10 days)     ** No results found for the last 240 hours. **        No radiology results for the last 7 days          Current Medications:  Scheduled Meds:  acetaminophen 1,000 mg Oral Q8H   apixaban 2.5 mg Oral Q12H   famotidine 20 mg Oral BID AC   influenza vaccine 0.5 mL Intramuscular Once   metoprolol tartrate 25 mg Oral Q12H   sodium chloride 10 mL Intravenous Q12H     Continuous Infusions:  lactated ringers 9 mL/hr Last Rate: 9 mL/hr (11/17/19 0752)   sodium chloride 125 mL/hr Last Rate: Stopped (11/17/19 1300)   sodium chloride 100 mL/hr Last Rate: 100 mL/hr (11/17/19 1617)     PRN Meds:.•  acetaminophen **OR** acetaminophen **OR** acetaminophen  •  bisacodyl  •  bisacodyl  •  docusate sodium  •  hydrALAZINE  •  HYDROcodone-acetaminophen  •  HYDROmorphone **AND** naloxone  •  ondansetron **OR** ondansetron  •  sodium chloride  •  traMADol    Assessment:    Procedure(s):  TOTAL HIP ARTHROPLASTY ANTERIOR WITH HANA TABLE    Patient Active Problem List   Diagnosis   • Hypertension   • Hyperlipidemia   • GERD (gastroesophageal reflux disease)   • Chest pain, atypical   • Abnormal EKG       PLAN:   Continues current post-op course  Anticoagulation: Eliquis  Dressing Change PRN  Mobilize with PT as tolerated per protocol  Confused at times. Tylenol and tramadol only for pain. No narcotics.  Weight Bearing: WBAT  Discharge Plan: OK to plan for discharge from orthopadic perspective.      NATHANAEL Kuhn    Date: 11/18/2019    Time: 6:31 AM

## 2019-11-18 NOTE — THERAPY EVALUATION
Patient Name: Corey Lawson  : 1940    MRN: [id number]                              Today's Date: 2019       Admit Date: 2019    Visit Dx:     ICD-10-CM ICD-9-CM   1. Closed fracture of neck of left femur, initial encounter (CMS/LTAC, located within [facility]) S72.002A 820.8     Patient Active Problem List   Diagnosis   • Hypertension   • Hyperlipidemia   • GERD (gastroesophageal reflux disease)   • Chest pain, atypical   • Abnormal EKG     Past Medical History:   Diagnosis Date   • GERD (gastroesophageal reflux disease)    • Hyperlipidemia    • Hypertension      Past Surgical History:   Procedure Laterality Date   • CARDIAC CATHETERIZATION     • TONSILLECTOMY     • TOTAL HIP ARTHROPLASTY Left 2019    Procedure: TOTAL HIP ARTHROPLASTY ANTERIOR WITH HANA TABLE;  Surgeon: Dary Reyes MD;  Location: [facility] OR;  Service: Orthopedics     General Information     Row Name 19 1303          PT Evaluation Time/Intention    Document Type  evaluation  -MW     Mode of Treatment  physical therapy  -MW     Row Name 19 1303          General Information    Patient Profile Reviewed?  yes  -MW     Prior Level of Function  independent:;community mobility  -MW     Existing Precautions/Restrictions  fall;hip, anterior  -MW     Barriers to Rehab  none identified  -MW     Row Name 19 1303          Home Main Entrance    Number of Stairs, Main Entrance  none  -MW     Row Name 19 1303          Cognitive Assessment/Intervention- PT/OT    Orientation Status (Cognition)  oriented x 3  -MW     Row Name 19 1303          Safety Issues, Functional Mobility    Safety Issues Affecting Function (Mobility)  ability to follow commands  -MW     Impairments Affecting Function (Mobility)  balance;pain;range of motion (ROM);strength;endurance/activity tolerance  -MW     Comment, Safety Issues/Impairments (Mobility)  Gait belt used for safety.  -MW       User Key  (r) = Recorded By, (t) = Taken By, (c) = Cosigned By     Initials Name Provider Type    Cee Cornejo PT Physical Therapist        Mobility     Row Name 11/18/19 1304          Bed Mobility Assessment/Treatment    Bed Mobility Assessment/Treatment  supine-sit-supine  -MW     Supine-Sit-Supine [place] (Bed Mobility)  moderate assist (50% patient effort)  -MW     Assistive Device (Bed Mobility)  bed rails;head of bed elevated  -MW     Row Name 11/18/19 1304          Sit-Stand Transfer    Sit-Stand [place] (Transfers)  minimum assist (75% patient effort);contact guard  -MW     Assistive Device (Sit-Stand Transfers)  walker, front-wheeled  -MW     Row Name 11/18/19 1304          Gait/Stairs Assessment/Training    Gait/Stairs Assessment/Training  gait/ambulation assistive device  -MW     [place] Level (Gait)  minimum assist (75% patient effort)  -MW     Assistive Device (Gait)  walker, front-wheeled  -MW     Distance in Feet (Gait)  50  -MW     Pattern (Gait)  step-to  -MW     Deviations/Abnormal Patterns (Gait)  left sided deviations;antalgic;leighton decreased;gait speed decreased  -MW     Bilateral Gait Deviations  forward flexed posture  -MW     Comment (Gait/Stairs)  No LOB, slow gait  -MW     Row Name 11/18/19 1304          Mobility Assessment/Intervention    Extremity Weight-bearing Status  left lower extremity  -MW     Left Lower Extremity (Weight-bearing Status)  weight-bearing as tolerated (WBAT)  -MW       User Key  (r) = Recorded By, (t) = Taken By, (c) = Cosigned By    Initials Name Provider Type    Cee Cornejo PT Physical Therapist        Obj/Interventions     Row Name 11/18/19 1306          General ROM    GENERAL ROM COMMENTS  WFL except LLE  -MW     Row Name 11/18/19 1306          MMT (Manual Muscle Testing)    General MMT Comments  WFL except LLE   -MW     Row Name 11/18/19 1306          Therapeutic Exercise    Position (Therapeutic Exercise)  supine;seated  -MW     Comment (Therapeutic Exercise)  Therex LLE hip protocol x 10 reps  each  -MW     Row Name 11/18/19 1306          Static Sitting Balance    Level of [place] (Unsupported Sitting, Static Balance)  supervision  -     Row Name 11/18/19 1306          Dynamic Sitting Balance    Level of [place], Reaches Outside Midline (Sitting, Dynamic Balance)  supervision;contact guard assist  -MW     Row Name 11/18/19 1306          Static Standing Balance    Level of [place] (Supported Standing, Static Balance)  contact guard assist  -     Assistive Device Utilized (Supported Standing, Static Balance)  walker, rolling  -MW     Row Name 11/18/19 1306          Dynamic Standing Balance    Level of [place], Reaches Outside Midline (Standing, Dynamic Balance)  minimal assist, 75% patient effort  -     Assistive Device Utilized (Supported Standing, Dynamic Balance)  walker, rolling  -MW     Row Name 11/18/19 1306          Sensory Assessment/Intervention    Sensory General Assessment  no sensation deficits identified  -       User Key  (r) = Recorded By, (t) = Taken By, (c) = Cosigned By    Initials Name Provider Type    MW Cee Carballo, PT Physical Therapist        Goals/Plan     [facility] Name 11/18/19 1309          Bed Mobility Goal 1 (PT)    Activity/Assistive Device (Bed Mobility Goal 1, PT)  bed mobility activities, all  -     [place] Level/Cues Needed (Bed Mobility Goal 1, PT)  minimum assist (75% or more patient effort)  -     Time Frame (Bed Mobility Goal 1, PT)  3 days  -[facility] Name 11/18/19 1309          Transfer Goal 1 (PT)    Activity/Assistive Device (Transfer Goal 1, PT)  transfers, all;walker, rolling  -     [place] Level/Cues Needed (Transfer Goal 1, PT)  supervision required;contact guard assist  -     Time Frame (Transfer Goal 1, PT)  2 - 3 days  -[facility] Name 11/18/19 1309          Gait Training Goal 1 (PT)    Activity/Assistive Device (Gait Training Goal 1, PT)  assistive device use;gait (walking locomotion);walker, rolling  -MW      [place] Level (Gait Training Goal 1, PT)  contact guard assist  -MW     Time Frame (Gait Training Goal 1, PT)  2 - 3 days  -MW     Row Name 11/18/19 1309          Patient Education Goal (PT)    Activity (Patient Education Goal, PT)  HEP and pt education  -MW     [place]/Cues/Accuracy (Memory Goal 2, PT)  demonstrates adequately;verbalizes understanding  -MW     Time Frame (Patient Education Goal, PT)  3 days  -MW       User Key  (r) = Recorded By, (t) = Taken By, (c) = Cosigned By    Initials Name Provider Type    MW Cee Carballo, PT Physical Therapist        Clinical Impression     Row Name 11/18/19 1307          Pain Assessment    Additional Documentation  Pain Scale: Numbers Pre/Post-Treatment (Group)  -MW     Row Name 11/18/19 1307          Pain Scale: Numbers Pre/Post-Treatment    Pain Scale: Numbers, Pretreatment  0/10 - no pain  -MW     Pain Scale: Numbers, Post-Treatment  0/10 - no pain  -MW     Pain Location - Side  Left  -MW     Pain Location  hip  -MW     Pain Intervention(s)  Ambulation/increased activity;Rest;Repositioned  -MW     Row Name 11/18/19 1307          Plan of Care Review    Plan of Care Reviewed With  patient  -MW     Row Name 11/18/19 1307          Physical Therapy Clinical Impression    Patient/Family Goals Statement (PT Clinical Impression)  Pt agreeable to PT eval.  -MW     Criteria for Skilled Interventions Met (PT Clinical Impression)  yes;treatment indicated  -MW     Rehab Potential (PT Clinical Summary)  good, to achieve stated therapy goals  -MW     Predicted Duration of Therapy (PT)  5 days  -MW     Row Name 11/18/19 1307          Vital Signs    O2 Delivery Pre Treatment  room air  -MW     O2 Delivery Intra Treatment  room air  -MW     O2 Delivery Post Treatment  room air  -MW     Pre Patient Position  Supine  -MW     Intra Patient Position  Standing  -MW     Post Patient Position  Supine  -MW     Row Name 11/18/19 1306          Positioning and Restraints     Pre-Treatment Position  in bed  -MW     Post Treatment Position  bed  -MW     In Bed  notified nsg;call light within reach;encouraged to call for assist;exit alarm on;supine  -MW       User Key  (r) = Recorded By, (t) = Taken By, (c) = Cosigned By    Initials Name Provider Type    Cee Cornejo PT Physical Therapist        Outcome Measures     Row Name 11/18/19 1310          How much help from another person do you currently need...    Turning from your back to your side while in flat bed without using bedrails?  3  -MW     Moving from lying on back to sitting on the side of a flat bed without bedrails?  2  -MW     Moving to and from a bed to a chair (including a wheelchair)?  3  -MW     Standing up from a chair using your arms (e.g., wheelchair, bedside chair)?  3  -MW     Climbing 3-5 steps with a railing?  1  -MW     To walk in hospital room?  3  -MW     AM-PAC 6 Clicks Score (PT)  15  -MW     Row Name 11/18/19 1310          Functional Assessment    Outcome Measure Options  AM-PAC 6 Clicks Basic Mobility (PT)  -       User Key  (r) = Recorded By, (t) = Taken By, (c) = Cosigned By    Initials Name Provider Type    Cee Cornejo PT Physical Therapist        Physical Therapy Education     Title: PT OT SLP Therapies (Done)     Topic: Physical Therapy (Done)     Point: Mobility training (Done)     Learning Progress Summary           Patient Acceptance, E,D, VU,NR by CAMERON at 11/18/2019  1:11 PM                   Point: Home exercise program (Done)     Learning Progress Summary           Patient Acceptance, E,D, VU,NR by CAMERON at 11/18/2019  1:11 PM                   Point: Body mechanics (Done)     Learning Progress Summary           Patient Acceptance, E,D, VU,NR by CAMERON at 11/18/2019  1:11 PM                   Point: Precautions (Done)     Learning Progress Summary           Patient Acceptance, E,D, VU,NR by CAMERON at 11/18/2019  1:11 PM                               User Key     Initials Effective Dates Name  Provider Type Discipline     09/17/19 -  Cee Carballo PT Physical Therapist PT              PT Recommendation and Plan  Planned Therapy Interventions (PT Eval): balance training, bed mobility training, gait training, home exercise program, patient/family education, ROM (range of motion), strengthening, transfer training  Outcome Summary/Treatment Plan (PT)  Anticipated Discharge Disposition (PT): home with assist, home with home health, skilled nursing facility, inpatient rehabilitation facility(dependent on progress)  Plan of Care Reviewed With: patient  Outcome Summary: Pt is a 78 yo male s/p Left ISSAC. He sustained a closed femoral neck fracture after having a fall. He presents with decreased balance, strength, ROM and functional mobility. Today able to perform bed mobility with Mod A, STS at RW CG/Jami, and gait x 50' with Min A. Gait further limited by pt fatigue. Prior to surgery he was living with his wife in a home with no steps to enter, RW for mobility. He may benefit from skilled PT to address functional deficits and maximize level of independence prior to discharge.      Time Calculation:   PT Charges     Row Name 11/18/19 1316             Time Calculation    Start Time  0924  -MW      Stop Time  0944  -MW      Time Calculation (min)  20 min  -MW      PT Received On  11/18/19  -MW      PT - Next Appointment  11/19/19  -MW      PT Goal Re-Cert Due Date  11/25/19  -MW         Time Calculation- PT    Total Timed Code Minutes- PT  17 minute(s)  -MW        User Key  (r) = Recorded By, (t) = Taken By, (c) = Cosigned By    Initials Name Provider Type     Cee Carballo PT Physical Therapist        Therapy Charges for Today     Code Description Service Date Service Provider Modifiers Qty    56051306345 HC PT EVAL MOD COMPLEXITY 1 11/18/2019 Cee Carballo, PT GP 1    96959999166 HC PT THER PROC EA 15 MIN 11/18/2019 Cee Carballo PT GP 1          PT G-Codes  Outcome Measure Options: AM-PAC 6 Clicks Basic  Mobility (PT)  AM-PAC 6 Clicks Score (PT): 15    Cee Carballo, PT  11/18/2019

## 2019-11-18 NOTE — PLAN OF CARE
Problem: Patient Care Overview  Goal: Plan of Care Review   11/18/19 1311   OTHER   Outcome Summary Pt is a 80 yo male s/p Left ISSAC. He sustained a closed femoral neck fracture after having a fall. He presents with decreased balance, strength, ROM and functional mobility. Today able to perform bed mobility with Mod A, STS at RW CG/Jami, and gait x 50' with Min A. Gait further limited by pt fatigue. Prior to surgery he was living with his wife in a home with no steps to enter, RW for mobility. He may benefit from skilled PT to address functional deficits and maximize level of independence prior to discharge.    Coping/Psychosocial   Plan of Care Reviewed With patient

## 2019-11-18 NOTE — PROGRESS NOTES
Discharge Planning Assessment  [facility]     Patient Name: Corey Lawson  MRN: [id number]  Today's Date: 11/18/2019    Admit Date: 11/16/2019    Discharge Needs Assessment     Row Name 11/18/19 1225       Living Environment    Lives With  spouse    Name(s) of Who Lives With Patient  Sharda hernández    Current Living Arrangements  home/apartment/condo    Provides Primary Care For  no one, unable/limited ability to care for self    Family Caregiver if Needed  spouse    Family Caregiver Names  Sharda hernández    Quality of Family Relationships  helpful;involved;supportive       Resource/Environmental Concerns    Resource/Environmental Concerns  none    Transportation Concerns  car, none       Transition Planning    Patient/Family Anticipates Transition to  inpatient rehabilitation facility    Patient/Family Anticipated Services at Transition  ;skilled nursing    Transportation Anticipated  family or friend will provide       Discharge Needs Assessment    Readmission Within the Last 30 Days  no previous admission in last 30 days    Concerns to be Addressed  basic needs    Equipment Currently Used at Home  cane, straight    Anticipated Changes Related to Illness  inability to care for self    Offered/Gave Vendor List  yes    Patient's Choice of Community Agency(s)  Provided with RTR and SNF list- will follow up tomorrow for facility choices        Discharge Plan     Row Name 11/18/19 1478       Plan    Plan  SNF-follow up with family for facility choices- will need [employer] MCR pre-cert    Patient/Family in Agreement with Plan  yes    Plan Comments  Spoke with patient and wife at bedside.  Introduced self and explained role.  Facesheet verified.  Patient lives with his wife, Sharda Lawson [phone], in a single story home.  He uses a cane for ambulation.  He has been to SNF and used HH in [place], but has not utilized any of these services in [place].   Explained that patient will likely need SNF at [place].  Provided  with [facility] and SNF list.  Also encouraged wife to get on Medicare.gov.  Explained that patient will likely be ready for dc in next few days so CCP will follow up in AM for facility choices.  Will need [employer] MCR pre-cert.  Transfer packet started and in CCP office. Valencia Vanegas RN        Destination      No service coordination in this encounter.      Durable Medical Equipment      No service coordination in this encounter.      Dialysis/Infusion      No service coordination in this encounter.      Home Medical Care      No service coordination in this encounter.      Therapy      No service coordination in this encounter.      Community Resources      No service coordination in this encounter.          Demographic Summary     Row Name 11/18/19 1224       General Information    Admission Type  inpatient    Arrived From  home    Required Notices Provided  Important Message from Medicare    Referral Source  admission list    Reason for Consult  discharge planning    Preferred Language  English        Functional Status     Row Name 11/18/19 1225       Functional Status    Usual Activity Tolerance  good    Current Activity Tolerance  moderate       Functional Status, IADL    Medications  independent    Meal Preparation  independent    Housekeeping  independent    Laundry  independent    Shopping  independent       Mental Status    General Appearance WDL  WDL       Mental Status Summary    Recent Changes in Mental Status/Cognitive Functioning  no changes        Psychosocial    No documentation.       Abuse/Neglect    No documentation.       Legal    No documentation.       Substance Abuse    No documentation.       Patient Forms    No documentation.           Valencia Vanegas RN

## 2019-11-18 NOTE — SIGNIFICANT NOTE
Son called to see if someone could come and sit with him since he is jumping out of bed, pulled out IV and refusing new IV to be placed

## 2019-11-18 NOTE — PLAN OF CARE
Problem: Patient Care Overview  Goal: Plan of Care Review  Outcome: Ongoing (interventions implemented as appropriate)   11/18/19 3523   Plan of Care Review   Progress no change   OTHER   Outcome Summary VSS, BP better since son came to stay the night and patient agreed to take med, prior to son coming back patient was very paranoid and combative at times, multiple attempts at getting OOB and pulled out IV, RA, SL, voiding per BRP, MIRIAN in place, denied pain to hip, unable to teach at this time R/T confusion, will need rehab on DC   Coping/Psychosocial   Plan of Care Reviewed With patient     Goal: Individualization and Mutuality  Outcome: Ongoing (interventions implemented as appropriate)    Goal: Discharge Needs Assessment  Outcome: Ongoing (interventions implemented as appropriate)      Problem: Fall Risk (Adult)  Goal: Identify Related Risk Factors and Signs and Symptoms  Outcome: Outcome(s) achieved Date Met: 11/18/19    Goal: Absence of Fall  Outcome: Ongoing (interventions implemented as appropriate)      Problem: Skin Injury Risk (Adult)  Goal: Identify Related Risk Factors and Signs and Symptoms  Outcome: Outcome(s) achieved Date Met: 11/18/19    Goal: Skin Health and Integrity  Outcome: Ongoing (interventions implemented as appropriate)      Problem: Fracture Orthopaedic (Adult)  Goal: Signs and Symptoms of Listed Potential Problems Will be Absent, Minimized or Managed (Fracture Orthopaedic)  Outcome: Ongoing (interventions implemented as appropriate)

## 2019-11-18 NOTE — PROGRESS NOTES
"DAILY PROGRESS NOTE  [facility]    Patient Identification:  Name: Corey Lawson  Age: 79 y.o.  Sex: male  :  1940  MRN: [id number]         Primary Care Physician: Provider, No Known    Subjective:  Interval History:He is confused.  Better today.    Objective:    Scheduled Meds:    acetaminophen 1,000 mg Oral Q8H   apixaban 2.5 mg Oral Q12H   famotidine 20 mg Oral BID AC   influenza vaccine 0.5 mL Intramuscular Once   metoprolol tartrate 25 mg Oral Q12H   sodium chloride 10 mL Intravenous Q12H     Continuous Infusions:    lactated ringers 9 mL/hr Last Rate: 9 mL/hr (19 0752)       Vital signs in last 24 hours:  Temp:  [97.1 °F (36.2 °C)-99.2 °F (37.3 °C)] 99.2 °F (37.3 °C)  Heart Rate:  [61-90] 79  Resp:  [16] 16  BP: (116-174)/(65-97) 123/71    Intake/Output:    Intake/Output Summary (Last 24 hours) at 2019 0955  Last data filed at 2019 0755  Gross per 24 hour   Intake 2260 ml   Output 450 ml   Net 1810 ml       Exam:  /71 (BP Location: Left arm, Patient Position: Lying)   Pulse 79   Temp 99.2 °F (37.3 °C) (Axillary)   Resp 16   Ht 180 cm (70.87\")   Wt 73.6 kg (162 lb 4.1 oz)   SpO2 96%   BMI 22.72 kg/m²     General Appearance:    Confused, cooperative, no distress   Head:    Normocephalic, without obvious abnormality, atraumatic   Eyes:       Throat:   Lips, tongue, gums normal   Neck:   Supple, symmetrical, trachea midline, no JVD   Lungs:     Clear to auscultation bilaterally, respirations unlabored   Chest Wall:    No tenderness or deformity    Heart:    Regular rate and rhythm, S1 and S2 normal, no murmur,no  Rub or gallop   Abdomen:     Soft, non-tender, bowel sounds active, no masses, no organomegaly    Extremities:   Extremities normal,left hip surgical changes, no cyanosis or edema   Pulses:      Skin:   Skin is warm and dry,  no rashes or palpable lesions   Neurologic:   no focal deficits noted, confused      Lab Results (last 72 hours)     Procedure " Component Value Units Date/Time    TSH [[id number]]  (Normal) Collected:  11/17/19 0330    Specimen:  Blood Updated:  11/17/19 0431     TSH 2.010 uIU/mL     Troponin [[id number]]  (Normal) Collected:  11/17/19 0330    Specimen:  Blood Updated:  11/17/19 0431     Troponin T <0.010 ng/mL     Narrative:       Troponin T Reference Range:  <= 0.03 ng/mL-   Negative for AMI  >0.03 ng/mL-     Abnormal for myocardial necrosis.  Clinicians would have to utilize clinical acumen, EKG, Troponin and serial changes to determine if it is an Acute Myocardial Infarction or myocardial injury due to an underlying chronic condition.     Basic Metabolic Panel [[id number]]  (Abnormal) Collected:  11/17/19 0330    Specimen:  Blood Updated:  11/17/19 0427     Glucose 151 mg/dL      BUN 16 mg/dL      Creatinine 0.81 mg/dL      Sodium 142 mmol/L      Potassium 3.9 mmol/L      Chloride 103 mmol/L      CO2 22.4 mmol/L      Calcium 9.0 mg/dL      eGFR Non African Amer 92 mL/min/1.73      BUN/Creatinine Ratio 19.8     Anion Gap 16.6 mmol/L     Narrative:       GFR Normal >60  Chronic Kidney Disease <60  Kidney Failure <15    CBC Auto Differential [[id number]]  (Abnormal) Collected:  11/17/19 0330    Specimen:  Blood Updated:  11/17/19 0402     WBC 9.37 10*3/mm3      RBC 5.34 10*6/mm3      Hemoglobin 17.4 g/dL      Hematocrit 51.6 %      MCV 96.6 fL      MCH 32.6 pg      MCHC 33.7 g/dL      RDW 15.2 %      RDW-SD 53.8 fl      MPV 9.8 fL      Platelets 149 10*3/mm3      Neutrophil % 76.5 %      Lymphocyte % 11.7 %      Monocyte % 10.2 %      Eosinophil % 0.5 %      Basophil % 0.7 %      Immature Grans % 0.4 %      Neutrophils, Absolute 7.15 10*3/mm3      Lymphocytes, Absolute 1.10 10*3/mm3      Monocytes, Absolute 0.96 10*3/mm3      Eosinophils, Absolute 0.05 10*3/mm3      Basophils, Absolute 0.07 10*3/mm3      Immature Grans, Absolute 0.04 10*3/mm3      nRBC 0.1 /100 WBC     Urinalysis, Microscopic Only - Urine, Clean Catch [[id number]]  (Abnormal)  Collected:  11/16/19 1916    Specimen:  Urine, Clean Catch Updated:  11/16/19 2014     RBC, UA 3-5 /HPF      WBC, UA 3-5 /HPF      Bacteria, UA 1+ /HPF      Squamous Epithelial Cells, UA 0-2 /HPF      Transitional Epithelial Cells, UA 0-2 /HPF      Hyaline Casts, UA 0-2 /LPF      Calcium Oxalate Crystals, UA Small/1+ /HPF      Mucus, UA Moderate/2+ /HPF      Methodology Manual Light Microscopy    Myoglobin Screen, Urine - Urine, Clean Catch [[id number]]  (Abnormal) Collected:  11/16/19 1933    Specimen:  Urine, Clean Catch Updated:  11/16/19 1959     Myoglobin, Qualitative Positive    Narrative:       Due to the similarities in the chemical properties of Hemoglobin and and Myoglobin, the presence of either will yield a positive Myoglobin Screen.    Urinalysis With Microscopic If Indicated (No Culture) - Urine, Clean Catch [[id number]]  (Abnormal) Collected:  11/16/19 1916    Specimen:  Urine, Clean Catch Updated:  11/16/19 1958     Color, UA [place]     Comment: Any Substance that causes an abnormal urine color can alter the accuracy of the chemical reactions.        Appearance, UA Clear     pH, UA 5.5     Specific Gravity, UA >=1.030     Glucose, UA Negative     Ketones, UA 40 mg/dL (2+)     Bilirubin, UA Negative     Blood, UA Trace     Protein, UA >=300 mg/dL (3+)     Leuk Esterase, UA Trace     Nitrite, UA Positive     Urobilinogen, UA 1.0 E.U./dL    aPTT [[id number]]  (Normal) Collected:  11/16/19 1521    Specimen:  Blood from Arm, Right Updated:  11/16/19 1910     PTT 30.9 seconds     Protime-INR [[id number]]  (Normal) Collected:  11/16/19 1521    Specimen:  Blood from Arm, Right Updated:  11/16/19 1910     Protime 13.2 Seconds      INR 1.03    Troponin [[id number]]  (Normal) Collected:  11/16/19 1521    Specimen:  Blood Updated:  11/16/19 1603     Troponin T <0.010 ng/mL     Narrative:       Troponin T Reference Range:  <= 0.03 ng/mL-   Negative for AMI  >0.03 ng/mL-     Abnormal for myocardial necrosis.   Clinicians would have to utilize clinical acumen, EKG, Troponin and serial changes to determine if it is an Acute Myocardial Infarction or myocardial injury due to an underlying chronic condition.     Comprehensive Metabolic Panel [[id number]]  (Abnormal) Collected:  11/16/19 0940    Specimen:  Blood Updated:  11/16/19 1016     Glucose 113 mg/dL      BUN 12 mg/dL      Creatinine 0.89 mg/dL      Sodium 141 mmol/L      Potassium 3.6 mmol/L      Chloride 97 mmol/L      CO2 28.0 mmol/L      Calcium 9.5 mg/dL      Total Protein 7.2 g/dL      Albumin 4.10 g/dL      ALT (SGPT) 12 U/L      AST (SGOT) 22 U/L      Alkaline Phosphatase 87 U/L      Total Bilirubin 1.7 mg/dL      eGFR Non African Amer 83 mL/min/1.73      Globulin 3.1 gm/dL      A/G Ratio 1.3 g/dL      BUN/Creatinine Ratio 13.5     Anion Gap 16.0 mmol/L     Narrative:       GFR Normal >60  Chronic Kidney Disease <60  Kidney Failure <15    Troponin [[id number]]  (Normal) Collected:  11/16/19 0940    Specimen:  Blood Updated:  11/16/19 1016     Troponin T <0.010 ng/mL     Narrative:       Troponin T Reference Range:  <= 0.03 ng/mL-   Negative for AMI  >0.03 ng/mL-     Abnormal for myocardial necrosis.  Clinicians would have to utilize clinical acumen, EKG, Troponin and serial changes to determine if it is an Acute Myocardial Infarction or myocardial injury due to an underlying chronic condition.     CK [[id number]]  (Normal) Collected:  11/16/19 0940    Specimen:  Blood Updated:  11/16/19 1016     Creatine Kinase 127 U/L     CBC & Differential [[id number]] Collected:  11/16/19 0940    Specimen:  Blood Updated:  11/16/19 0959    Narrative:       The following orders were created for panel order CBC & Differential.  Procedure                               Abnormality         Status                     ---------                               -----------         ------                     CBC Auto Differential[[id number]]        Abnormal            Final result                  Please view results for these tests on the individual orders.    CBC Auto Differential [[id number]]  (Abnormal) Collected:  11/16/19 0940    Specimen:  Blood Updated:  11/16/19 0959     WBC 9.04 10*3/mm3      RBC 6.00 10*6/mm3      Hemoglobin 19.3 g/dL      Hematocrit 58.1 %      MCV 96.8 fL      MCH 32.2 pg      MCHC 33.2 g/dL      RDW 16.2 %      RDW-SD 54.3 fl      MPV 9.9 fL      Platelets 145 10*3/mm3      Neutrophil % 83.6 %      Lymphocyte % 7.7 %      Monocyte % 7.3 %      Eosinophil % 0.2 %      Basophil % 0.6 %      Immature Grans % 0.6 %      Neutrophils, Absolute 7.56 10*3/mm3      Lymphocytes, Absolute 0.70 10*3/mm3      Monocytes, Absolute 0.66 10*3/mm3      Eosinophils, Absolute 0.02 10*3/mm3      Basophils, Absolute 0.05 10*3/mm3      Immature Grans, Absolute 0.05 10*3/mm3      nRBC 0.0 /100 WBC         Data Review:  Results from last 7 days   Lab Units 11/18/19  0557 11/17/19 0330 11/16/19 0940   SODIUM mmol/L 139 142 141   POTASSIUM mmol/L 3.6 3.9 3.6   CHLORIDE mmol/L 103 103 97*   CO2 mmol/L 25.9 22.4 28.0   BUN mg/dL 18 16 12   CREATININE mg/dL 0.73* 0.81 0.89   GLUCOSE mg/dL 109* 151* 113*   CALCIUM mg/dL 8.6 9.0 9.5     Results from last 7 days   Lab Units 11/18/19  0557 11/17/19 0330 11/16/19 0940   WBC 10*3/mm3 9.37 9.37 9.04   HEMOGLOBIN g/dL 14.9 17.4 19.3*   HEMATOCRIT % 42.5 51.6* 58.1*   PLATELETS 10*3/mm3 150 149 145     Results from last 7 days   Lab Units 11/17/19  0330   TSH uIU/mL 2.010         Lab Results   Lab Value Date/Time    TROPONINT <0.010 11/17/2019 0330    TROPONINT <0.010 11/16/2019 1521    TROPONINT <0.010 11/16/2019 0940         Results from last 7 days   Lab Units 11/16/19  0940   ALK PHOS U/L 87   BILIRUBIN mg/dL 1.7*   ALT (SGPT) U/L 12   AST (SGOT) U/L 22     Results from last 7 days   Lab Units 11/17/19  0330   TSH uIU/mL 2.010         No results found for: POCGLU  Results from last 7 days   Lab Units 11/16/19  1521   INR  1.03       Past Medical History:    Diagnosis Date   • GERD (gastroesophageal reflux disease)    • Hyperlipidemia    • Hypertension        Assessment:  Active Hospital Problems    Diagnosis  POA   • Hypertension [I10]  Yes   • Hyperlipidemia [E78.5]  Unknown   • GERD (gastroesophageal reflux disease) [K21.9]  Unknown   • Chest pain, atypical [R07.89]  Unknown   • Abnormal EKG [R94.31]  Yes      Resolved Hospital Problems    Diagnosis Date Resolved POA   • **Closed fracture of neck of left femur (CMS/HCC) [S72.002A] 11/17/2019 Yes       Plan:  Continue post op care. Follow lab.  Will need SNU for rehab    Quang Mcadams MD  11/18/2019  9:55 AM

## 2019-11-19 LAB
ANION GAP SERPL CALCULATED.3IONS-SCNC: 11.1 MMOL/L (ref 5–15)
BASOPHILS # BLD AUTO: 0.04 10*3/MM3 (ref 0–0.2)
BASOPHILS NFR BLD AUTO: 0.5 % (ref 0–1.5)
BUN BLD-MCNC: 19 MG/DL (ref 8–23)
BUN/CREAT SERPL: 28.8 (ref 7–25)
CALCIUM SPEC-SCNC: 8.8 MG/DL (ref 8.6–10.5)
CHLORIDE SERPL-SCNC: 103 MMOL/L (ref 98–107)
CO2 SERPL-SCNC: 24.9 MMOL/L (ref 22–29)
CREAT BLD-MCNC: 0.66 MG/DL (ref 0.76–1.27)
DEPRECATED RDW RBC AUTO: 51.7 FL (ref 37–54)
EOSINOPHIL # BLD AUTO: 0.1 10*3/MM3 (ref 0–0.4)
EOSINOPHIL NFR BLD AUTO: 1.3 % (ref 0.3–6.2)
ERYTHROCYTE [DISTWIDTH] IN BLOOD BY AUTOMATED COUNT: 14.7 % (ref 12.3–15.4)
GFR SERPL CREATININE-BSD FRML MDRD: 117 ML/MIN/1.73
GLUCOSE BLD-MCNC: 102 MG/DL (ref 65–99)
HCT VFR BLD AUTO: 44.2 % (ref 37.5–51)
HGB BLD-MCNC: 14.6 G/DL (ref 13–17.7)
IMM GRANULOCYTES # BLD AUTO: 0.02 10*3/MM3 (ref 0–0.05)
IMM GRANULOCYTES NFR BLD AUTO: 0.3 % (ref 0–0.5)
LYMPHOCYTES # BLD AUTO: 1 10*3/MM3 (ref 0.7–3.1)
LYMPHOCYTES NFR BLD AUTO: 12.7 % (ref 19.6–45.3)
MCH RBC QN AUTO: 31.4 PG (ref 26.6–33)
MCHC RBC AUTO-ENTMCNC: 33 G/DL (ref 31.5–35.7)
MCV RBC AUTO: 95.1 FL (ref 79–97)
MONOCYTES # BLD AUTO: 0.94 10*3/MM3 (ref 0.1–0.9)
MONOCYTES NFR BLD AUTO: 12 % (ref 5–12)
NEUTROPHILS # BLD AUTO: 5.76 10*3/MM3 (ref 1.7–7)
NEUTROPHILS NFR BLD AUTO: 73.2 % (ref 42.7–76)
NRBC BLD AUTO-RTO: 0 /100 WBC (ref 0–0.2)
PLATELET # BLD AUTO: 153 10*3/MM3 (ref 140–450)
PMV BLD AUTO: 9.7 FL (ref 6–12)
POTASSIUM BLD-SCNC: 4 MMOL/L (ref 3.5–5.2)
RBC # BLD AUTO: 4.65 10*6/MM3 (ref 4.14–5.8)
SODIUM BLD-SCNC: 139 MMOL/L (ref 136–145)
WBC NRBC COR # BLD: 7.86 10*3/MM3 (ref 3.4–10.8)

## 2019-11-19 PROCEDURE — 85025 COMPLETE CBC W/AUTO DIFF WBC: CPT | Performed by: HOSPITALIST

## 2019-11-19 PROCEDURE — 80048 BASIC METABOLIC PNL TOTAL CA: CPT | Performed by: HOSPITALIST

## 2019-11-19 PROCEDURE — 97110 THERAPEUTIC EXERCISES: CPT

## 2019-11-19 RX ADMIN — APIXABAN 2.5 MG: 2.5 TABLET, FILM COATED ORAL at 20:34

## 2019-11-19 RX ADMIN — FAMOTIDINE 20 MG: 20 TABLET, FILM COATED ORAL at 18:14

## 2019-11-19 RX ADMIN — METOPROLOL TARTRATE 25 MG: 25 TABLET ORAL at 20:34

## 2019-11-19 RX ADMIN — ACETAMINOPHEN 1000 MG: 500 TABLET, FILM COATED ORAL at 06:01

## 2019-11-19 RX ADMIN — ACETAMINOPHEN 1000 MG: 500 TABLET, FILM COATED ORAL at 20:34

## 2019-11-19 RX ADMIN — METOPROLOL TARTRATE 25 MG: 25 TABLET ORAL at 08:32

## 2019-11-19 RX ADMIN — ACETAMINOPHEN 1000 MG: 500 TABLET, FILM COATED ORAL at 12:36

## 2019-11-19 RX ADMIN — APIXABAN 2.5 MG: 2.5 TABLET, FILM COATED ORAL at 08:32

## 2019-11-19 RX ADMIN — FAMOTIDINE 20 MG: 20 TABLET, FILM COATED ORAL at 06:02

## 2019-11-19 NOTE — PLAN OF CARE
Problem: Patient Care Overview  Goal: Plan of Care Review  Outcome: Ongoing (interventions implemented as appropriate)   11/19/19 6578   Plan of Care Review   Progress improving   OTHER   Outcome Summary VSS, no complaints of pain, up with assist, voiding per BRP, RA, IV out, less confused this shift, educated on BP monitoring, rehab needed on DC   Coping/Psychosocial   Plan of Care Reviewed With patient     Goal: Individualization and Mutuality  Outcome: Ongoing (interventions implemented as appropriate)    Goal: Discharge Needs Assessment  Outcome: Ongoing (interventions implemented as appropriate)      Problem: Fall Risk (Adult)  Goal: Absence of Fall  Outcome: Ongoing (interventions implemented as appropriate)      Problem: Skin Injury Risk (Adult)  Goal: Skin Health and Integrity  Outcome: Ongoing (interventions implemented as appropriate)      Problem: Fracture Orthopaedic (Adult)  Goal: Signs and Symptoms of Listed Potential Problems Will be Absent, Minimized or Managed (Fracture Orthopaedic)  Outcome: Ongoing (interventions implemented as appropriate)

## 2019-11-19 NOTE — PLAN OF CARE
Problem: Patient Care Overview  Goal: Plan of Care Review  Outcome: Ongoing (interventions implemented as appropriate)   11/18/19 8769   Plan of Care Review   Progress improving   OTHER   Outcome Summary PT IS PD #1 FROM AM R ISSAC S/P FALL AND FRACTURE. CONFUSION HAS GREATLY IMPROVED AND PT ANSWERS QUESTIONS APPROPRIATLY AND ABLE TO VOICE HIS NEEDS. AMBULATES WITH ASSIST OF ONE AND A WALKER. PAIN WELL CONTROLLED WITH MINIMAL PAIN PILLS. PT EDUCATED REGARDING B/P MEDS AND B/P MONITORING.    Coping/Psychosocial   Plan of Care Reviewed With patient     Goal: Individualization and Mutuality  Outcome: Ongoing (interventions implemented as appropriate)    Goal: Discharge Needs Assessment  Outcome: Ongoing (interventions implemented as appropriate)    Goal: Interprofessional Rounds/Family Conf  Outcome: Ongoing (interventions implemented as appropriate)      Problem: Fall Risk (Adult)  Goal: Absence of Fall  Outcome: Ongoing (interventions implemented as appropriate)      Problem: Skin Injury Risk (Adult)  Goal: Skin Health and Integrity  Outcome: Ongoing (interventions implemented as appropriate)      Problem: Fracture Orthopaedic (Adult)  Goal: Signs and Symptoms of Listed Potential Problems Will be Absent, Minimized or Managed (Fracture Orthopaedic)  Outcome: Ongoing (interventions implemented as appropriate)

## 2019-11-19 NOTE — PLAN OF CARE
Problem: Patient Care Overview  Goal: Plan of Care Review   11/19/19 1701   OTHER   Outcome Summary Pt demonstrating a continued need for assistance with ambulation and maintains B knees bent while walking in the jett for 60 ft with min A and use of FWW. He requires verbal cueing for safety and sequencing but shows improvement in tolerance for standing activity. Continue POC as tolerated with focus on increasing ambulation distance and improving standing posture.    Coping/Psychosocial   Plan of Care Reviewed With patient;spouse

## 2019-11-19 NOTE — PROGRESS NOTES
Continued Stay Note  [facility]     Patient Name: Corey Lawson  MRN: [id number]  Today's Date: 11/19/2019    Admit Date: 11/16/2019    Discharge Plan     Row Name 11/19/19 1226       Plan    Plan  SNF- [facility] to evaluate- will need [employer] MCR pre-cert    Plan Comments  Spoke with patient and wife at bedside.  They are interested in [facility] @ [place].  Referral sent to [facility].  Patient will need [employer] MCR pre-cert. Valencia Vanegas RN        Discharge Codes    No documentation.       Expected Discharge Date and Time     Expected Discharge Date Expected Discharge Time    Nov 20, 2019             aVlencia Vanegas RN

## 2019-11-19 NOTE — PLAN OF CARE
Problem: Patient Care Overview  Goal: Plan of Care Review  Outcome: Ongoing (interventions implemented as appropriate)   11/19/19 9337   OTHER   Outcome Summary A&Ox4 this shift. Resting comfortably. VSS. MIRIAN dressing CDI. Up with assist x1. Voiding function intact, BRP. Pain controlled with scheduled PO tylenol. BM today. Plans to D/C to SNF pending placement. Will cont to monitor.   Coping/Psychosocial   Plan of Care Reviewed With patient

## 2019-11-19 NOTE — DISCHARGE PLACEMENT REQUEST
"Corey Sanford (79 y.o. Male)     Date of Birth Social Security Number Address Home Phone MRN    1940  [id number] [place] [id number] [phone] [id number]    Zoroastrian Marital Status          Unknown        Admission Date Admission Type Admitting Provider Attending Provider Department, Room/Bed    11/16/19 Emergency Quang Mcadams MD Beard, Lyle E, MD [address], P794/1    Discharge Date Discharge Disposition Discharge Destination                       Attending Provider:  Quang Mcadams MD    Allergies:  Nexium [Esomeprazole Magnesium]    Isolation:  None   Infection:  None   Code Status:  CPR    Ht:  180 cm (70.87\")   Wt:  73.6 kg (162 lb 4.1 oz)    Admission Cmt:  None   Principal Problem:  Closed fracture of neck of left femur (CMS/[facility]) [S72.002A]                 Active Insurance as of 11/16/2019     Primary Coverage     Payor Plan Insurance Group Employer/Plan Group    [employer] MEDICARE REPLACEMENT [employer] MEDICARE REPL [id number]     Payor Plan Address Payor Plan Phone Number Payor Plan Fax Number Effective Dates    [address] [address] [phone]  1/1/2019 - None Entered    [facility] [address]       Subscriber Name Subscriber Birth Date Member ID       COREY SANFORD 1940 [id number]                 Emergency Contacts      (Rel.) Home Phone Work Phone Mobile Phone    CHANDANOLI LYONS (Spouse) [phone] -- --    VIOLETAMAURA (Son) [phone] -- --            "

## 2019-11-19 NOTE — THERAPY TREATMENT NOTE
Patient Name: Corey Lawson  : 1940    MRN: [id number]                              Today's Date: 2019       Admit Date: 2019    Visit Dx:     ICD-10-CM ICD-9-CM   1. Closed fracture of neck of left femur, initial encounter (CMS/[facility]) S72.002A 820.8     Patient Active Problem List   Diagnosis   • Hypertension   • Hyperlipidemia   • GERD (gastroesophageal reflux disease)   • Chest pain, atypical   • Abnormal EKG     Past Medical History:   Diagnosis Date   • GERD (gastroesophageal reflux disease)    • Hyperlipidemia    • Hypertension      Past Surgical History:   Procedure Laterality Date   • CARDIAC CATHETERIZATION     • TONSILLECTOMY     • TOTAL HIP ARTHROPLASTY Left 2019    Procedure: TOTAL HIP ARTHROPLASTY ANTERIOR WITH HANA TABLE;  Surgeon: Dary Reyes MD;  Location: [facility];  Service: Orthopedics     General Information     Row Name 19          PT Evaluation Time/Intention    Document Type  therapy note (daily note)  (Pended)   -AP     Mode of Treatment  physical therapy  (Pended)   -AP     Row Name 19          General Information    Patient Profile Reviewed?  yes  (Pended)   -AP     Existing Precautions/Restrictions  fall;hip, anterior  (Pended)   -AP     Row Name 19          Cognitive Assessment/Intervention- PT/OT    Orientation Status (Cognition)  oriented x 3  (Pended)   -AP     Cognitive Assessment/Intervention Comment  pt very lethargic, states he just woke up  (Pended)   -AP     Row Name 19          Safety Issues, Functional Mobility    Impairments Affecting Function (Mobility)  balance;pain;range of motion (ROM);strength;endurance/activity tolerance  (Pended)   -AP       User Key  (r) = Recorded By, (t) = Taken By, (c) = Cosigned By    Initials Name Provider Type    AP Bethanie Casas, PT Student PT Student        Mobility     Row Name 19          Bed Mobility Assessment/Treatment    Bed Mobility  Assessment/Treatment  bed mobility (all) activities  (Pended)   -AP     Supine-Sit-Supine Duarte (Bed Mobility)  moderate assist (50% patient effort)  (Pended)   -AP     Assistive Device (Bed Mobility)  bed rails;head of bed elevated  (Pended)   -AP     Row Name 11/19/19 1656          Sit-Stand Transfer    Sit-Stand Duarte (Transfers)  minimum assist (75% patient effort)  (Pended)   -AP     Assistive Device (Sit-Stand Transfers)  walker, front-wheeled  (Pended)   -AP     Row Name 11/19/19 1656          Gait/Stairs Assessment/Training    Gait/Stairs Assessment/Training  gait/ambulation independence  (Pended)   -AP     Duarte Level (Gait)  minimum assist (75% patient effort)  (Pended)   -AP     Assistive Device (Gait)  walker, front-wheeled  (Pended)   -AP     Distance in Feet (Gait)  60  (Pended)   -AP     Pattern (Gait)  step-to  (Pended)   -AP     Deviations/Abnormal Patterns (Gait)  gait speed decreased;stride length decreased;antalgic  (Pended)   -AP     Bilateral Gait Deviations  heel strike decreased;forward flexed posture  (Pended)   -AP     Comment (Gait/Stairs)  maintains B knees bent during ambulation  (Pended)   -AP     Row Name 11/19/19 1656          Mobility Assessment/Intervention    Extremity Weight-bearing Status  left upper extremity  (Pended)   -AP     Left Upper Extremity (Weight-bearing Status)  weight-bearing as tolerated (WBAT)  (Pended)   -AP       User Key  (r) = Recorded By, (t) = Taken By, (c) = Cosigned By    Initials Name Provider Type    Bethanie Higuera, PT Student PT Student        Obj/Interventions    No documentation.       Goals/Plan    No documentation.       Clinical Impression     Row Name 11/19/19 1657          Pain Scale: Numbers Pre/Post-Treatment    Pain Scale: Numbers, Pretreatment  1/10  (Pended)   -AP     Pain Location - Side  Left  (Pended)   -AP     Pain Location  hip  (Pended)   -AP     Pre/Post Treatment Pain Comment  pts wife states he has asked for  pain medicine today as pain has been increased  (Pended)   -AP     Pain Intervention(s)  Medication (See MAR);Repositioned;Ambulation/increased activity  (Pended)   -AP     Row Name 11/19/19 1657          Plan of Care Review    Plan of Care Reviewed With  patient;spouse  (Pended)   -AP     Row Name 11/19/19 1657          Positioning and Restraints    Pre-Treatment Position  in bed  (Pended)   -AP     Post Treatment Position  chair  (Pended)   -AP     In Chair  reclined;call light within reach;encouraged to call for assist;with family/caregiver  (Pended)   -AP       User Key  (r) = Recorded By, (t) = Taken By, (c) = Cosigned By    Initials Name Provider Type    Bethanie Higuera, PT Student PT Student        Outcome Measures     Row Name 11/19/19 1700          How much help from another person do you currently need...    Turning from your back to your side while in flat bed without using bedrails?  3  (Pended)   -AP     Moving from lying on back to sitting on the side of a flat bed without bedrails?  2  (Pended)   -AP     Moving to and from a bed to a chair (including a wheelchair)?  3  (Pended)   -AP     Standing up from a chair using your arms (e.g., wheelchair, bedside chair)?  3  (Pended)   -AP     Climbing 3-5 steps with a railing?  1  (Pended)   -AP     To walk in hospital room?  3  (Pended)   -AP     AM-PAC 6 Clicks Score (PT)  15  (Pended)   -AP     Row Name 11/19/19 1700          Functional Assessment    Outcome Measure Options  AM-PAC 6 Clicks Basic Mobility (PT)  (Pended)   -AP       User Key  (r) = Recorded By, (t) = Taken By, (c) = Cosigned By    Initials Name Provider Type    Bethanie Higuera, PT Student PT Student        Physical Therapy Education     Title: PT OT SLP Therapies (Done)     Topic: Physical Therapy (Done)     Point: Mobility training (Done)     Learning Progress Summary           Patient Acceptance, E, VU,NR by ROSALIA at 11/19/2019  5:01 PM    Acceptance, E,D, VU,NR by CAMERON at 11/18/2019  1:11  PM                   Point: Home exercise program (Done)     Learning Progress Summary           Patient Acceptance, E,D, VU,NR by  at 11/18/2019  1:11 PM                   Point: Body mechanics (Done)     Learning Progress Summary           Patient Acceptance, E, VU,NR by  at 11/19/2019  5:01 PM    Acceptance, E,D, VU,NR by  at 11/18/2019  1:11 PM                   Point: Precautions (Done)     Learning Progress Summary           Patient Acceptance, E, VU,NR by  at 11/19/2019  5:01 PM    Acceptance, E,D, VU,NR by  at 11/18/2019  1:11 PM                               User Key     Initials Effective Dates Name Provider Type Discipline     09/04/19 -  Bethanie Casas PT Student PT Student PT     09/17/19 -  Cee Carballo PT Physical Therapist PT              PT Recommendation and Plan     Outcome Summary/Treatment Plan (PT)  Anticipated Discharge Disposition (PT): (P) home with assist, skilled nursing facility  Plan of Care Reviewed With: (P) patient, spouse  Outcome Summary: (P) Pt demonstrating a continued need for assistance with ambulation and maintains B knees bent while walking in the jett for 60 ft with min A and use of FWW. He requires verbal cueing for safety and sequencing but showsimprovement in tolerance for standing activity. Continue POC as tolerated with focus on increasing ambulation distance and improving standing posture.      Time Calculation:   PT Charges     Row Name 11/19/19 1703             Time Calculation    Start Time  1655  (Pended)   -AP      Stop Time  1705  (Pended)   -AP      Time Calculation (min)  10 min  (Pended)   -AP      PT Received On  11/19/19  (Pended)   -AP      PT - Next Appointment  11/20/19  (Pended)   -AP        User Key  (r) = Recorded By, (t) = Taken By, (c) = Cosigned By    Initials Name Provider Type    AP Bethanie Casas, PT Student PT Student        Therapy Charges for Today     Code Description Service Date Service Provider Modifiers Qty    [id number]  HC PT THER PROC EA 15 MIN 11/19/2019 Bethanie Casas, PT Student GP 1          PT G-Codes  Outcome Measure Options: (P) AM-PAC 6 Clicks Basic Mobility (PT)  AM-PAC 6 Clicks Score (PT): (P) 15    Bethanie Casas PT Student  11/19/2019

## 2019-11-19 NOTE — PROGRESS NOTES
"DAILY PROGRESS NOTE  [facility]    Patient Identification:  Name: Corey Lawson  Age: 79 y.o.  Sex: male  :  1940  MRN: [id number]         Primary Care Physician: Provider, No Known    Subjective:  Interval History:He is confused.  Better today.  Hip pain.    Objective:    Scheduled Meds:    acetaminophen 1,000 mg Oral Q8H   apixaban 2.5 mg Oral Q12H   famotidine 20 mg Oral BID AC   influenza vaccine 0.5 mL Intramuscular Once   metoprolol tartrate 25 mg Oral Q12H   sodium chloride 10 mL Intravenous Q12H     Continuous Infusions:    lactated ringers 9 mL/hr Last Rate: 9 mL/hr (19 0752)       Vital signs in last 24 hours:  Temp:  [96.8 °F (36 °C)-98.4 °F (36.9 °C)] 96.8 °F (36 °C)  Heart Rate:  [62-76] 70  Resp:  [16] 16  BP: (107-146)/(63-85) 146/84    Intake/Output:    Intake/Output Summary (Last 24 hours) at 2019 0950  Last data filed at 2019 0900  Gross per 24 hour   Intake 360 ml   Output 700 ml   Net -340 ml       Exam:  /84 (BP Location: Right arm, Patient Position: Lying)   Pulse 70   Temp 96.8 °F (36 °C) (Oral)   Resp 16   Ht 180 cm (70.87\")   Wt 73.6 kg (162 lb 4.1 oz)   SpO2 97%   BMI 22.72 kg/m²     General Appearance:    Confused, cooperative, no distress   Head:    Normocephalic, without obvious abnormality, atraumatic   Eyes:       Throat:   Lips, tongue, gums normal   Neck:   Supple, symmetrical, trachea midline, no JVD   Lungs:     Clear to auscultation bilaterally, respirations unlabored   Chest Wall:    No tenderness or deformity    Heart:    Regular rate and rhythm, S1 and S2 normal, no murmur,no  Rub or gallop   Abdomen:     Soft, non-tender, bowel sounds active, no masses, no organomegaly    Extremities:   Extremities normal,left hip surgical changes, no cyanosis or edema   Pulses:      Skin:   Skin is warm and dry,  no rashes or palpable lesions   Neurologic:   no focal deficits noted, confused      Lab Results (last 72 hours)     Procedure " Component Value Units Date/Time    TSH [[id number]]  (Normal) Collected:  11/17/19 0330    Specimen:  Blood Updated:  11/17/19 0431     TSH 2.010 uIU/mL     Troponin [[id number]]  (Normal) Collected:  11/17/19 0330    Specimen:  Blood Updated:  11/17/19 0431     Troponin T <0.010 ng/mL     Narrative:       Troponin T Reference Range:  <= 0.03 ng/mL-   Negative for AMI  >0.03 ng/mL-     Abnormal for myocardial necrosis.  Clinicians would have to utilize clinical acumen, EKG, Troponin and serial changes to determine if it is an Acute Myocardial Infarction or myocardial injury due to an underlying chronic condition.     Basic Metabolic Panel [[id number]]  (Abnormal) Collected:  11/17/19 0330    Specimen:  Blood Updated:  11/17/19 0427     Glucose 151 mg/dL      BUN 16 mg/dL      Creatinine 0.81 mg/dL      Sodium 142 mmol/L      Potassium 3.9 mmol/L      Chloride 103 mmol/L      CO2 22.4 mmol/L      Calcium 9.0 mg/dL      eGFR Non African Amer 92 mL/min/1.73      BUN/Creatinine Ratio 19.8     Anion Gap 16.6 mmol/L     Narrative:       GFR Normal >60  Chronic Kidney Disease <60  Kidney Failure <15    CBC Auto Differential [[id number]]  (Abnormal) Collected:  11/17/19 0330    Specimen:  Blood Updated:  11/17/19 0402     WBC 9.37 10*3/mm3      RBC 5.34 10*6/mm3      Hemoglobin 17.4 g/dL      Hematocrit 51.6 %      MCV 96.6 fL      MCH 32.6 pg      MCHC 33.7 g/dL      RDW 15.2 %      RDW-SD 53.8 fl      MPV 9.8 fL      Platelets 149 10*3/mm3      Neutrophil % 76.5 %      Lymphocyte % 11.7 %      Monocyte % 10.2 %      Eosinophil % 0.5 %      Basophil % 0.7 %      Immature Grans % 0.4 %      Neutrophils, Absolute 7.15 10*3/mm3      Lymphocytes, Absolute 1.10 10*3/mm3      Monocytes, Absolute 0.96 10*3/mm3      Eosinophils, Absolute 0.05 10*3/mm3      Basophils, Absolute 0.07 10*3/mm3      Immature Grans, Absolute 0.04 10*3/mm3      nRBC 0.1 /100 WBC     Urinalysis, Microscopic Only - Urine, Clean Catch [[id number]]  (Abnormal)  Collected:  11/16/19 1916    Specimen:  Urine, Clean Catch Updated:  11/16/19 2014     RBC, UA 3-5 /HPF      WBC, UA 3-5 /HPF      Bacteria, UA 1+ /HPF      Squamous Epithelial Cells, UA 0-2 /HPF      Transitional Epithelial Cells, UA 0-2 /HPF      Hyaline Casts, UA 0-2 /LPF      Calcium Oxalate Crystals, UA Small/1+ /HPF      Mucus, UA Moderate/2+ /HPF      Methodology Manual Light Microscopy    Myoglobin Screen, Urine - Urine, Clean Catch [[id number]]  (Abnormal) Collected:  11/16/19 1933    Specimen:  Urine, Clean Catch Updated:  11/16/19 1959     Myoglobin, Qualitative Positive    Narrative:       Due to the similarities in the chemical properties of Hemoglobin and and Myoglobin, the presence of either will yield a positive Myoglobin Screen.    Urinalysis With Microscopic If Indicated (No Culture) - Urine, Clean Catch [[id number]]  (Abnormal) Collected:  11/16/19 1916    Specimen:  Urine, Clean Catch Updated:  11/16/19 1958     Color, UA [place]     Comment: Any Substance that causes an abnormal urine color can alter the accuracy of the chemical reactions.        Appearance, UA Clear     pH, UA 5.5     Specific Gravity, UA >=1.030     Glucose, UA Negative     Ketones, UA 40 mg/dL (2+)     Bilirubin, UA Negative     Blood, UA Trace     Protein, UA >=300 mg/dL (3+)     Leuk Esterase, UA Trace     Nitrite, UA Positive     Urobilinogen, UA 1.0 E.U./dL    aPTT [[id number]]  (Normal) Collected:  11/16/19 1521    Specimen:  Blood from Arm, Right Updated:  11/16/19 1910     PTT 30.9 seconds     Protime-INR [[id number]]  (Normal) Collected:  11/16/19 1521    Specimen:  Blood from Arm, Right Updated:  11/16/19 1910     Protime 13.2 Seconds      INR 1.03    Troponin [[id number]]  (Normal) Collected:  11/16/19 1521    Specimen:  Blood Updated:  11/16/19 1603     Troponin T <0.010 ng/mL     Narrative:       Troponin T Reference Range:  <= 0.03 ng/mL-   Negative for AMI  >0.03 ng/mL-     Abnormal for myocardial necrosis.   Clinicians would have to utilize clinical acumen, EKG, Troponin and serial changes to determine if it is an Acute Myocardial Infarction or myocardial injury due to an underlying chronic condition.     Comprehensive Metabolic Panel [[id number]]  (Abnormal) Collected:  11/16/19 0940    Specimen:  Blood Updated:  11/16/19 1016     Glucose 113 mg/dL      BUN 12 mg/dL      Creatinine 0.89 mg/dL      Sodium 141 mmol/L      Potassium 3.6 mmol/L      Chloride 97 mmol/L      CO2 28.0 mmol/L      Calcium 9.5 mg/dL      Total Protein 7.2 g/dL      Albumin 4.10 g/dL      ALT (SGPT) 12 U/L      AST (SGOT) 22 U/L      Alkaline Phosphatase 87 U/L      Total Bilirubin 1.7 mg/dL      eGFR Non African Amer 83 mL/min/1.73      Globulin 3.1 gm/dL      A/G Ratio 1.3 g/dL      BUN/Creatinine Ratio 13.5     Anion Gap 16.0 mmol/L     Narrative:       GFR Normal >60  Chronic Kidney Disease <60  Kidney Failure <15    Troponin [[id number]]  (Normal) Collected:  11/16/19 0940    Specimen:  Blood Updated:  11/16/19 1016     Troponin T <0.010 ng/mL     Narrative:       Troponin T Reference Range:  <= 0.03 ng/mL-   Negative for AMI  >0.03 ng/mL-     Abnormal for myocardial necrosis.  Clinicians would have to utilize clinical acumen, EKG, Troponin and serial changes to determine if it is an Acute Myocardial Infarction or myocardial injury due to an underlying chronic condition.     CK [[id number]]  (Normal) Collected:  11/16/19 0940    Specimen:  Blood Updated:  11/16/19 1016     Creatine Kinase 127 U/L     CBC & Differential [[id number]] Collected:  11/16/19 0940    Specimen:  Blood Updated:  11/16/19 0959    Narrative:       The following orders were created for panel order CBC & Differential.  Procedure                               Abnormality         Status                     ---------                               -----------         ------                     CBC Auto Differential[[id number]]        Abnormal            Final result                  Please view results for these tests on the individual orders.    CBC Auto Differential [[id number]]  (Abnormal) Collected:  11/16/19 0940    Specimen:  Blood Updated:  11/16/19 0959     WBC 9.04 10*3/mm3      RBC 6.00 10*6/mm3      Hemoglobin 19.3 g/dL      Hematocrit 58.1 %      MCV 96.8 fL      MCH 32.2 pg      MCHC 33.2 g/dL      RDW 16.2 %      RDW-SD 54.3 fl      MPV 9.9 fL      Platelets 145 10*3/mm3      Neutrophil % 83.6 %      Lymphocyte % 7.7 %      Monocyte % 7.3 %      Eosinophil % 0.2 %      Basophil % 0.6 %      Immature Grans % 0.6 %      Neutrophils, Absolute 7.56 10*3/mm3      Lymphocytes, Absolute 0.70 10*3/mm3      Monocytes, Absolute 0.66 10*3/mm3      Eosinophils, Absolute 0.02 10*3/mm3      Basophils, Absolute 0.05 10*3/mm3      Immature Grans, Absolute 0.05 10*3/mm3      nRBC 0.0 /100 WBC         Data Review:  Results from last 7 days   Lab Units 11/19/19  0333 11/18/19  0557 11/17/19  0330   SODIUM mmol/L 139 139 142   POTASSIUM mmol/L 4.0 3.6 3.9   CHLORIDE mmol/L 103 103 103   CO2 mmol/L 24.9 25.9 22.4   BUN mg/dL 19 18 16   CREATININE mg/dL 0.66* 0.73* 0.81   GLUCOSE mg/dL 102* 109* 151*   CALCIUM mg/dL 8.8 8.6 9.0     Results from last 7 days   Lab Units 11/19/19  0333 11/18/19  0557 11/17/19  0330   WBC 10*3/mm3 7.86 9.37 9.37   HEMOGLOBIN g/dL 14.6 14.9 17.4   HEMATOCRIT % 44.2 42.5 51.6*   PLATELETS 10*3/mm3 153 150 149     Results from last 7 days   Lab Units 11/17/19  0330   TSH uIU/mL 2.010         Lab Results   Lab Value Date/Time    TROPONINT <0.010 11/17/2019 0330    TROPONINT <0.010 11/16/2019 1521    TROPONINT <0.010 11/16/2019 0940         Results from last 7 days   Lab Units 11/16/19  0940   ALK PHOS U/L 87   BILIRUBIN mg/dL 1.7*   ALT (SGPT) U/L 12   AST (SGOT) U/L 22     Results from last 7 days   Lab Units 11/17/19  0330   TSH uIU/mL 2.010         No results found for: POCGLU  Results from last 7 days   Lab Units 11/16/19  1521   INR  1.03       Past Medical History:    Diagnosis Date   • GERD (gastroesophageal reflux disease)    • Hyperlipidemia    • Hypertension        Assessment:  Active Hospital Problems    Diagnosis  POA   • Hypertension [I10]  Yes   • Hyperlipidemia [E78.5]  Unknown   • GERD (gastroesophageal reflux disease) [K21.9]  Unknown   • Chest pain, atypical [R07.89]  Unknown   • Abnormal EKG [R94.31]  Yes      Resolved Hospital Problems    Diagnosis Date Resolved POA   • **Closed fracture of neck of left femur (CMS/HCC) [S72.002A] 11/17/2019 Yes       Plan:  Continue post op care. Follow lab.  Will need SNU for rehab. Need precert.  DC planning.    Quang Mcadams MD  11/19/2019  9:50 AM

## 2019-11-19 NOTE — PROGRESS NOTES
Continued Stay Note  [facility]     Patient Name: Corey Lawson  MRN: [id number]  Today's Date: 11/19/2019    Admit Date: 11/16/2019    Discharge Plan     Row Name 11/19/19 1445       Plan    Plan Comments  Spoke with Ruth and [facility] in not in network with insurance.  Wife to review facility options tonight and CCP will follow up in AM. Valencia Vanegas RN    Row Name 11/19/19 1226       Plan    Plan  SNF- [facility] to evaluate- will need [employer] M[employer] pre-cert    Plan Comments  Spoke with patient and wife at bedside.  They are interested in [facility] @ [place].  Referral sent to Ruth.  Patient will need [employer] M[employer] pre-cert. Valencia Vanegas RN        Discharge Codes    No documentation.       Expected Discharge Date and Time     Expected Discharge Date Expected Discharge Time    Nov 20, 2019             Valencia Vanegas RN

## 2019-11-20 LAB
ANION GAP SERPL CALCULATED.3IONS-SCNC: 11 MMOL/L (ref 5–15)
BASOPHILS # BLD AUTO: 0.05 10*3/MM3 (ref 0–0.2)
BASOPHILS NFR BLD AUTO: 0.7 % (ref 0–1.5)
BUN BLD-MCNC: 16 MG/DL (ref 8–23)
BUN/CREAT SERPL: 22.2 (ref 7–25)
CALCIUM SPEC-SCNC: 8.8 MG/DL (ref 8.6–10.5)
CHLORIDE SERPL-SCNC: 103 MMOL/L (ref 98–107)
CO2 SERPL-SCNC: 27 MMOL/L (ref 22–29)
CREAT BLD-MCNC: 0.72 MG/DL (ref 0.76–1.27)
DEPRECATED RDW RBC AUTO: 51 FL (ref 37–54)
EOSINOPHIL # BLD AUTO: 0.23 10*3/MM3 (ref 0–0.4)
EOSINOPHIL NFR BLD AUTO: 3.1 % (ref 0.3–6.2)
ERYTHROCYTE [DISTWIDTH] IN BLOOD BY AUTOMATED COUNT: 14.5 % (ref 12.3–15.4)
GFR SERPL CREATININE-BSD FRML MDRD: 106 ML/MIN/1.73
GLUCOSE BLD-MCNC: 104 MG/DL (ref 65–99)
HCT VFR BLD AUTO: 42.9 % (ref 37.5–51)
HGB BLD-MCNC: 14.7 G/DL (ref 13–17.7)
IMM GRANULOCYTES # BLD AUTO: 0.02 10*3/MM3 (ref 0–0.05)
IMM GRANULOCYTES NFR BLD AUTO: 0.3 % (ref 0–0.5)
LYMPHOCYTES # BLD AUTO: 1.07 10*3/MM3 (ref 0.7–3.1)
LYMPHOCYTES NFR BLD AUTO: 14.3 % (ref 19.6–45.3)
MCH RBC QN AUTO: 33.2 PG (ref 26.6–33)
MCHC RBC AUTO-ENTMCNC: 34.3 G/DL (ref 31.5–35.7)
MCV RBC AUTO: 96.8 FL (ref 79–97)
MONOCYTES # BLD AUTO: 0.8 10*3/MM3 (ref 0.1–0.9)
MONOCYTES NFR BLD AUTO: 10.7 % (ref 5–12)
NEUTROPHILS # BLD AUTO: 5.32 10*3/MM3 (ref 1.7–7)
NEUTROPHILS NFR BLD AUTO: 70.9 % (ref 42.7–76)
NRBC BLD AUTO-RTO: 0 /100 WBC (ref 0–0.2)
PLATELET # BLD AUTO: 169 10*3/MM3 (ref 140–450)
PMV BLD AUTO: 10.1 FL (ref 6–12)
POTASSIUM BLD-SCNC: 3.5 MMOL/L (ref 3.5–5.2)
RBC # BLD AUTO: 4.43 10*6/MM3 (ref 4.14–5.8)
SODIUM BLD-SCNC: 141 MMOL/L (ref 136–145)
WBC NRBC COR # BLD: 7.49 10*3/MM3 (ref 3.4–10.8)

## 2019-11-20 PROCEDURE — 85025 COMPLETE CBC W/AUTO DIFF WBC: CPT | Performed by: HOSPITALIST

## 2019-11-20 PROCEDURE — 97110 THERAPEUTIC EXERCISES: CPT

## 2019-11-20 PROCEDURE — 80048 BASIC METABOLIC PNL TOTAL CA: CPT | Performed by: HOSPITALIST

## 2019-11-20 RX ORDER — AMLODIPINE BESYLATE 5 MG/1
5 TABLET ORAL
Status: DISCONTINUED | OUTPATIENT
Start: 2019-11-20 | End: 2019-11-21 | Stop reason: HOSPADM

## 2019-11-20 RX ADMIN — FAMOTIDINE 20 MG: 20 TABLET, FILM COATED ORAL at 18:18

## 2019-11-20 RX ADMIN — METOPROLOL TARTRATE 25 MG: 25 TABLET ORAL at 08:58

## 2019-11-20 RX ADMIN — METOPROLOL TARTRATE 25 MG: 25 TABLET ORAL at 21:28

## 2019-11-20 RX ADMIN — ACETAMINOPHEN 1000 MG: 500 TABLET, FILM COATED ORAL at 05:48

## 2019-11-20 RX ADMIN — APIXABAN 2.5 MG: 2.5 TABLET, FILM COATED ORAL at 21:28

## 2019-11-20 RX ADMIN — FAMOTIDINE 20 MG: 20 TABLET, FILM COATED ORAL at 06:45

## 2019-11-20 RX ADMIN — APIXABAN 2.5 MG: 2.5 TABLET, FILM COATED ORAL at 08:58

## 2019-11-20 RX ADMIN — ACETAMINOPHEN 1000 MG: 500 TABLET, FILM COATED ORAL at 21:28

## 2019-11-20 RX ADMIN — AMLODIPINE BESYLATE 5 MG: 5 TABLET ORAL at 18:17

## 2019-11-20 RX ADMIN — ACETAMINOPHEN 1000 MG: 500 TABLET, FILM COATED ORAL at 12:49

## 2019-11-20 NOTE — PROGRESS NOTES
Continued Stay Note  [facility]     Patient Name: Corey Lawson  MRN: [id number]  Today's Date: 11/20/2019    Admit Date: 11/16/2019    Discharge Plan     Row Name 11/20/19 1610       Plan    Plan Comments  Spoke with Nadine.  [facility] has pre-cert and can accept tomorrow. Valencia Vanegas RN        Discharge Codes    No documentation.       Expected Discharge Date and Time     Expected Discharge Date Expected Discharge Time    Nov 21, 2019             Valencia Vanegas RN

## 2019-11-20 NOTE — DISCHARGE INSTRUCTIONS
Discharge and Follow up Instructions:     Total Hip Replacement Discharge Instructions:    I. ACTIVITIES:  1. Exercises:  ? Complete exercise program as taught by the hospital physical therapist 2 times per day  ? Exercise program will be advanced by the physical therapist  ? During the day be up ambulating every 2 hours (while awake) for short distances  ? Complete the ankle pump exercises at least 10 times per hour (while awake)  ? Elevate legs when in bed and for at least 30 minutes during the day.Use cold packs 20-30 minutes approximately 5 times per day. This should be done before and after completing your exercises and at any time you are experiencing pain/ stiffness in your operative extremity.      2. Activities of Daily Living:  ? No tub baths, hot tubs, or swimming pools for 4 weeks  ? May shower and let water run over the incision on post-operative day #5 if no drainage. Do not scrub or rub the incision. Simply let the water run over the incision and pat dry.    II. Restrictions  ? Continue anterior  hip precautions as taught at the hospital  ? Your surgeon will discuss with you when you will be able to drive again. Usual guidelines are you are to be off pain medications prior to driving.  ? Weight bearing is as tolerated  ? First week stay inside on even terrain. May go up and down stairs one stair at a time utilizing the hand rail once cleared by physical therapy to do so.  ? After one week, you may venture outside (if cleared to do so by physical therapist).    III. Precautions:  ? Everyone that comes near you should wash their hands  ? No elective dental, genital-urinary, or colon procedures or surgical procedures for 12 weeks after surgery unless absolutely necessary.  ?  If dental work or surgical procedure is deemed absolutely necessary, you will need to contact your surgeon as you will need to take antibiotics 1 hour prior to any dental work (including teeth cleanings).  ? Please discuss with your  surgeon prophylactic antibiotics as the length of time this intervention will be necessary for you varies with each patient’s health history and situation.  ? Avoid sick people. If you must be around someone who is ill, they should wear a mask.  ? Avoid visits to the Emergency Room or Urgent Care. If you feel you need to go to the Emergency Room, please notify your Surgeon's office.  ? Stockings are to be worn for one week after surgery and are to be placed on in the morning and removed at night. Observe your skin when stocking is removed for any problems. Monitor the stockings to ensure that any swelling is not causing the stockings to become too tight. In this case, remove stockings immediately.      IV. INCISION CARE:  ? Wash your hands prior to dressing changes  ? Change the dressing as needed to keep incision clean and dry. Utilize dry gauze and paper tape. Avoid touching the side of the gauze that goes against the incision with your hands.  ? No creams or ointments to the incision  ? May remove dressing once the incision is free of drainage  ? Do not touch or pick at the incision  ? Check incision every day and notify surgeon immediately if any of the following signs or symptoms are noted:  o Increase in redness  o Increase in swelling around the incision and of the entire extremity  o Increase in pain  o Drainage oozing from the incision  o Pulling apart of the edges of the incision  o Increase in overall body temperature (greater than 100.5 degrees)  ? You have absorbable sutures with steristrips, please do not remove the  steristrips for 14 days, you can shower on them 6 days after surgery.       V. Medications:   1. Anticoagulants: You will be discharged on an anticoagulant. This is a prophylactic medication that helps prevent blood clots during your post-operative period.  You will be on  Eliquis.  ? While taking the anticoagulant, you should avoid taking any additional aspirin, ibuprofen (Advil or  Motrin), Aleve (Naprosyn) or other non-steroidal anti-inflammatory medications.   ? Notify surgeon immediately if any marge bleeding is noted in the urine, stool, emesis, or from the nose or the incision. Blood in the stool will often appear as black rather than red. Blood in urine may appear as pink. Blood in emesis may appear as brown/black like coffee grounds.  ? You will need to apply pressure for longer periods of time to any cuts or abrasions to stop bleeding  ? Avoid alcohol while taking anticoagulants    2. Stool Softeners: You will be at greater risk of constipation after surgery due to being less mobile and the pain medications.   ? Take stool softeners as instructed by your surgeon while on pain medications. Over the counter Colace 100 mg 1-2 capsules twice daily.   ? If stools become too loose or too frequent, please decreases the dosage or stop the stool softener.  ? If constipation occurs despite use of stool softeners, you are to continue the stool softeners and add a laxative (Milk of Magnesia 1 ounce daily as needed).  ? Dulcolax oral tabs or suppository, or a fleets enema can also be utilized for constipation and can be obtained over the counter.   ? If above interventions are unsuccessful in inducing bowel movements, please contact your surgeon's office / family physician's office.  ? Drink plenty of fluids, and eat fruits and vegetables during your recovery time    3. Pain Medications utilized after surgery are narcotics and the law requires that the following information be given to all patients that are prescribed narcotics:  ? CLASSIFICATION: Pain medications are called Opioids and are narcotics  ? LEGALITIES: It is illegal to share narcotics with others and to drive within 24 hours of taking narcotics  ? POTENTIAL SIDE EFFECTS: Potential side effects of opioids include: nausea, vomiting, itching, dizziness, drowsiness, dry mouth, constipation, and difficulty urinating.  ? POTENTIAL ADVERSE  EFFECTS:   o Opioid tolerance can develop with use of pain medications and this simply means that it requires more and more of the medication to control pain; however, this is seen more in patients that use opioids for longer periods of time.  o Opioid dependence can develop with use of Opioids and this simply means that to stop the medication can cause withdrawal symptoms; however, this is seen with patients that use Opioids for longer periods of time.  o Opioid addiction can develop with use of Opioids and the incidence of this is very unlikely in patients who take the medications as ordered and stop the medications as instructed.  o Opioid overdose can be dangerous, but is unlikely when the medication is taken as ordered and stopped when ordered. It is important not to mix opioids with alcohol or with and type of sedative such as Benadryl as this can lead to over sedation and respiratory difficulty.  ? DOSAGE:   o Pain medications will need to be taken consistently for the first week to decrease pain and promote adequate pain relief and participation in physical therapy.  o After the initial surgical pain begins to resolve, you may begin to decrease the pain medication. By the end of 6 weeks, you should be off of pain medications.  o Refills will not be given by the office during evening hours, on weekends, or after 6 weeks post-op.  o To seek refills on pain medications during the initial 6 week post-operative period, you must call the office 48 hours in advance to request the refill. The office will then notify you when to  the prescription. DO NOT wait until you are out of the medication to request a refill.    V. FOLLOW-UP VISITS:  ? You will need to follow up in the office with your surgeon on December 16, 2019. Please call this number [phone]  to schedule this appointment.  If you have any concerns or suspected complications prior to your follow up visit, please call your surgeons office. Do not  wait until your appointment time if you suspect complications. These will need to be addressed in the office promptly.

## 2019-11-20 NOTE — THERAPY TREATMENT NOTE
Patient Name: Corey Lawson  : 1940    MRN: [id number]                              Today's Date: 2019       Admit Date: 2019    Visit Dx:     ICD-10-CM ICD-9-CM   1. Closed fracture of neck of left femur, initial encounter (CMS/[facility]) S72.002A 820.8     Patient Active Problem List   Diagnosis   • Closed fracture of neck of left femur (CMS/[facility])   • Hypertension   • Hyperlipidemia   • GERD (gastroesophageal reflux disease)   • Chest pain, atypical   • Abnormal EKG     Past Medical History:   Diagnosis Date   • GERD (gastroesophageal reflux disease)    • Hyperlipidemia    • Hypertension      Past Surgical History:   Procedure Laterality Date   • CARDIAC CATHETERIZATION     • TONSILLECTOMY     • TOTAL HIP ARTHROPLASTY Left 2019    Procedure: TOTAL HIP ARTHROPLASTY ANTERIOR WITH HANA TABLE;  Surgeon: Dary Reyes MD;  Location: [facility];  Service: Orthopedics     General Information     Row Name 19 1444          PT Evaluation Time/Intention    Document Type  therapy note (daily note)  -PC     Mode of Treatment  physical therapy  -PC     Row Name 19 1444          Cognitive Assessment/Intervention- PT/OT    Orientation Status (Cognition)  oriented x 3  -PC     Cognitive Assessment/Intervention Comment  pt more alert and interactive today  -PC       User Key  (r) = Recorded By, (t) = Taken By, (c) = Cosigned By    Initials Name Provider Type    PC Camilla Briones, PT Physical Therapist        Mobility     Row Name 19 1445          Bed Mobility Assessment/Treatment    Bed Mobility Assessment/Treatment  supine-sit  -PC     Supine-Sit [place] (Bed Mobility)  minimum assist (75% patient effort);verbal cues  -PC     Row Name 19 1445          Sit-Stand Transfer    Sit-Stand [place] (Transfers)  minimum assist (75% patient effort)  -PC     Assistive Device (Sit-Stand Transfers)  walker, front-wheeled  -PC     Row Name 19 1443          Gait/Stairs  Assessment/Training    Gait/Stairs Assessment/Training  gait/ambulation independence;gait/ambulation assistive device  -PC     [place] Level (Gait)  minimum assist (75% patient effort);verbal cues  -PC     Assistive Device (Gait)  walker, front-wheeled  -PC     Distance in Feet (Gait)  75 ft  -PC     Pattern (Gait)  step-to;step-through  -PC     Deviations/Abnormal Patterns (Gait)  antalgic;gait speed decreased;stride length decreased  -PC     Bilateral Gait Deviations  forward flexed posture;heel strike decreased  -PC     Comment (Gait/Stairs)  worked on upright posture, inc step length, reciprocal gait pattern  -PC       User Key  (r) = Recorded By, (t) = Taken By, (c) = Cosigned By    Initials Name Provider Type    PC Camilla Briones, PT Physical Therapist        Obj/Interventions     Row Name 11/20/19 1447          Therapeutic Exercise    Comment (Therapeutic Exercise)  10 reps THR ex   -PC       User Key  (r) = Recorded By, (t) = Taken By, (c) = Cosigned By    Initials Name Provider Type    PC Camilla Briones, PT Physical Therapist        Goals/Plan    No documentation.       Clinical Impression     Row Name 11/20/19 1447          Pain Assessment    Additional Documentation  Pain Scale: FACES Pre/Post-Treatment (Group)  -PC     Row Name 11/20/19 1447          Pain Scale: Numbers Pre/Post-Treatment    Pain Location - Side  Left  -PC     Pain Location  hip  -PC     Pain Intervention(s)  Medication (See MAR);Repositioned  -PC     Row Name 11/20/19 1447          Pain Scale: FACES Pre/Post-Treatment    Pain: FACES Scale, Pretreatment  2-->hurts little bit  -PC     Pain: FACES Scale, Post-Treatment  4-->hurts little more  -PC     Row Name 11/20/19 1447          Plan of Care Review    Plan of Care Reviewed With  patient;spouse  -PC     Row Name 11/20/19 1447          Positioning and Restraints    Pre-Treatment Position  in bed  -PC     Post Treatment Position  chair  -PC     In Chair  reclined;call light within  reach;encouraged to call for assist;exit alarm on;with family/caregiver  -PC       User Key  (r) = Recorded By, (t) = Taken By, (c) = Cosigned By    Initials Name Provider Type    PC Camilla Briones PT Physical Therapist        Outcome Measures     Row Name 11/20/19 6793          How much help from another person do you currently need...    Turning from your back to your side while in flat bed without using bedrails?  3  -PC     Moving from lying on back to sitting on the side of a flat bed without bedrails?  3  -PC     Moving to and from a bed to a chair (including a wheelchair)?  3  -PC     Standing up from a chair using your arms (e.g., wheelchair, bedside chair)?  3  -PC     Climbing 3-5 steps with a railing?  2  -PC     To walk in hospital room?  3  -PC     AM-PAC 6 Clicks Score (PT)  17  -PC       User Key  (r) = Recorded By, (t) = Taken By, (c) = Cosigned By    Initials Name Provider Type    PC Camilla Briones PT Physical Therapist        Physical Therapy Education     Title: PT OT SLP Therapies (In Progress)     Topic: Physical Therapy (In Progress)     Point: Mobility training (In Progress)     Learning Progress Summary           Patient Acceptance, E,D, NR by PC at 11/20/2019  2:49 PM    Acceptance, E, VU,NR by ROSALIA at 11/19/2019  5:01 PM    Acceptance, E,D, VU,NR by CAMERON at 11/18/2019  1:11 PM                   Point: Home exercise program (In Progress)     Learning Progress Summary           Patient Acceptance, E,D, NR by PC at 11/20/2019  2:49 PM    Acceptance, E,D, VU,NR by CAMERON at 11/18/2019  1:11 PM                   Point: Body mechanics (In Progress)     Learning Progress Summary           Patient Acceptance, E,D, NR by PC at 11/20/2019  2:49 PM    Acceptance, E, VU,NR by ROSALIA at 11/19/2019  5:01 PM    Acceptance, E,D, VU,NR by CAMERON at 11/18/2019  1:11 PM                   Point: Precautions (In Progress)     Learning Progress Summary           Patient Acceptance, E,D, NR by PC at 11/20/2019  2:49 PM     Acceptance, E, VU,NR by AP at 11/19/2019  5:01 PM    Acceptance, E,D, VU,NR by MW at 11/18/2019  1:11 PM                               User Key     Initials Effective Dates Name Provider Type Discipline    PC 04/03/18 -  Camilla Briones, PT Physical Therapist PT    AP 09/04/19 -  Bethanie Casas, PT Student PT Student PT    MW 09/17/19 -  Cee Carballo, PT Physical Therapist PT              PT Recommendation and Plan     Outcome Summary/Treatment Plan (PT)  Anticipated Discharge Disposition (PT): skilled nursing facility  Plan of Care Reviewed With: patient  Progress: improving  Outcome Summary: pt with improved toleration of treatment, required less assist and able to walk farther, working on improving quality and stability of gait, cont to rec SNF at discharge     Time Calculation:   PT Charges     Row Name 11/20/19 1451             Time Calculation    Start Time  1427  -PC      Stop Time  1442  -PC      Time Calculation (min)  15 min  -PC      PT Received On  11/20/19  -PC      PT - Next Appointment  11/21/19  -PC        User Key  (r) = Recorded By, (t) = Taken By, (c) = Cosigned By    Initials Name Provider Type    PC Camilla Briones, PT Physical Therapist        Therapy Charges for Today     Code Description Service Date Service Provider Modifiers Qty    22460181550 HC PT THER PROC EA 15 MIN 11/20/2019 Camilla Briones, PT GP 1          PT G-Codes  Outcome Measure Options: AM-PAC 6 Clicks Basic Mobility (PT)  AM-PAC 6 Clicks Score (PT): 17    Camilla Briones PT  11/20/2019

## 2019-11-20 NOTE — PLAN OF CARE
Problem: Patient Care Overview  Goal: Plan of Care Review  Outcome: Ongoing (interventions implemented as appropriate)   11/20/19 8355   Plan of Care Review   Progress improving   OTHER   Outcome Summary pt with improved toleration of treatment, required less assist and able to walk farther, working on improving quality and stability of gait, cont to rec SNF at discharge   Coping/Psychosocial   Plan of Care Reviewed With patient

## 2019-11-20 NOTE — DISCHARGE PLACEMENT REQUEST
"Corey Sanford (79 y.o. Male)     Date of Birth Social Security Number Address Home Phone MRN    1940  [id number] [place] [id number] [phone] [id number]    Advent[employer] Marital Status          Unknown        Admission Date Admission Type Admitting Provider Attending Provider Department, Room/Bed    11/16/19 Emergency Quang Mcadams MD Beard, Lyle E, MD [address], P794/1    Discharge Date Discharge Disposition Discharge Destination                       Attending Provider:  Quang Mcadams MD    Allergies:  Nexium [Esomeprazole Magnesium]    Isolation:  None   Infection:  None   Code Status:  CPR    Ht:  180 cm (70.87\")   Wt:  73.6 kg (162 lb 4.1 oz)    Admission Cmt:  None   Principal Problem:  Closed fracture of neck of left femur (CMS/[facility]) [S72.002A]                 Active Insurance as of 11/16/2019     Primary Coverage     Payor Plan Insurance Group Employer/Plan Group    HUMANA MEDICARE REPLACEMENT HU[employer]NA MEDICARE REPL [id number]     Payor Plan Address Payor Plan Phone Number Payor Plan Fax Number Effective Dates    [address] [address] [phone]  1/1/2019 - None Entered    [facility] [phone]       Subscriber Name Subscriber Birth Date Member ID       COREY SANFORD 1940 [id number]                 Emergency Contacts      (Rel.) Home Phone Work Phone Mobile Phone    CHANDANOLI LYONS (Spouse) [phone] -- --    VIOLETAMAURA (Son) [phone] -- --              "

## 2019-11-20 NOTE — PROGRESS NOTES
"DAILY PROGRESS NOTE  [facility]    Patient Identification:  Name: Corey Lawson  Age: 79 y.o.  Sex: male  :  1940  MRN: [id number]         Primary Care Physician: Provider, No Known    Subjective:  Interval History:He is confused.  Better today.  Hip pain.    Objective:    Scheduled Meds:    acetaminophen 1,000 mg Oral Q8H   apixaban 2.5 mg Oral Q12H   famotidine 20 mg Oral BID AC   influenza vaccine 0.5 mL Intramuscular Once   metoprolol tartrate 25 mg Oral Q12H   sodium chloride 10 mL Intravenous Q12H     Continuous Infusions:    lactated ringers 9 mL/hr Last Rate: 9 mL/hr (19 0752)       Vital signs in last 24 hours:  Temp:  [97 °F (36.1 °C)-98.3 °F (36.8 °C)] 97.6 °F (36.4 °C)  Heart Rate:  [61-71] 70  Resp:  [16-18] 18  BP: (134-178)/(81-95) 178/92    Intake/Output:    Intake/Output Summary (Last 24 hours) at 2019 1052  Last data filed at 2019 2048  Gross per 24 hour   Intake 360 ml   Output 450 ml   Net -90 ml       Exam:  /92 (BP Location: Right arm, Patient Position: Lying)   Pulse 70   Temp 97.6 °F (36.4 °C) (Oral)   Resp 18   Ht 180 cm (70.87\")   Wt 73.6 kg (162 lb 4.1 oz)   SpO2 97%   BMI 22.72 kg/m²     General Appearance:    Alert, cooperative, no distress   Head:    Normocephalic, without obvious abnormality, atraumatic   Eyes:       Throat:   Lips, tongue, gums normal   Neck:   Supple, symmetrical, trachea midline, no JVD   Lungs:     Clear to auscultation bilaterally, respirations unlabored   Chest Wall:    No tenderness or deformity    Heart:    Regular rate and rhythm, S1 and S2 normal, no murmur,no  Rub or gallop   Abdomen:     Soft, non-tender, bowel sounds active, no masses, no organomegaly    Extremities:   Extremities normal,left hip surgical changes, no cyanosis or edema   Pulses:      Skin:   Skin is warm and dry,  no rashes or palpable lesions   Neurologic:   no focal deficits noted      Lab Results (last 72 hours)     Procedure Component " Value Units Date/Time    TSH [[id number]]  (Normal) Collected:  11/17/19 0330    Specimen:  Blood Updated:  11/17/19 0431     TSH 2.010 uIU/mL     Troponin [[id number]]  (Normal) Collected:  11/17/19 0330    Specimen:  Blood Updated:  11/17/19 0431     Troponin T <0.010 ng/mL     Narrative:       Troponin T Reference Range:  <= 0.03 ng/mL-   Negative for AMI  >0.03 ng/mL-     Abnormal for myocardial necrosis.  Clinicians would have to utilize clinical acumen, EKG, Troponin and serial changes to determine if it is an Acute Myocardial Infarction or myocardial injury due to an underlying chronic condition.     Basic Metabolic Panel [[id number]]  (Abnormal) Collected:  11/17/19 0330    Specimen:  Blood Updated:  11/17/19 0427     Glucose 151 mg/dL      BUN 16 mg/dL      Creatinine 0.81 mg/dL      Sodium 142 mmol/L      Potassium 3.9 mmol/L      Chloride 103 mmol/L      CO2 22.4 mmol/L      Calcium 9.0 mg/dL      eGFR Non African Amer 92 mL/min/1.73      BUN/Creatinine Ratio 19.8     Anion Gap 16.6 mmol/L     Narrative:       GFR Normal >60  Chronic Kidney Disease <60  Kidney Failure <15    CBC Auto Differential [[id number]]  (Abnormal) Collected:  11/17/19 0330    Specimen:  Blood Updated:  11/17/19 0402     WBC 9.37 10*3/mm3      RBC 5.34 10*6/mm3      Hemoglobin 17.4 g/dL      Hematocrit 51.6 %      MCV 96.6 fL      MCH 32.6 pg      MCHC 33.7 g/dL      RDW 15.2 %      RDW-SD 53.8 fl      MPV 9.8 fL      Platelets 149 10*3/mm3      Neutrophil % 76.5 %      Lymphocyte % 11.7 %      Monocyte % 10.2 %      Eosinophil % 0.5 %      Basophil % 0.7 %      Immature Grans % 0.4 %      Neutrophils, Absolute 7.15 10*3/mm3      Lymphocytes, Absolute 1.10 10*3/mm3      Monocytes, Absolute 0.96 10*3/mm3      Eosinophils, Absolute 0.05 10*3/mm3      Basophils, Absolute 0.07 10*3/mm3      Immature Grans, Absolute 0.04 10*3/mm3      nRBC 0.1 /100 WBC     Urinalysis, Microscopic Only - Urine, Clean Catch [[id number]]  (Abnormal) Collected:   11/16/19 1916    Specimen:  Urine, Clean Catch Updated:  11/16/19 2014     RBC, UA 3-5 /HPF      WBC, UA 3-5 /HPF      Bacteria, UA 1+ /HPF      Squamous Epithelial Cells, UA 0-2 /HPF      Transitional Epithelial Cells, UA 0-2 /HPF      Hyaline Casts, UA 0-2 /LPF      Calcium Oxalate Crystals, UA Small/1+ /HPF      Mucus, UA Moderate/2+ /HPF      Methodology Manual Light Microscopy    Myoglobin Screen, Urine - Urine, Clean Catch [[id number]]  (Abnormal) Collected:  11/16/19 1933    Specimen:  Urine, Clean Catch Updated:  11/16/19 1959     Myoglobin, Qualitative Positive    Narrative:       Due to the similarities in the chemical properties of Hemoglobin and and Myoglobin, the presence of either will yield a positive Myoglobin Screen.    Urinalysis With Microscopic If Indicated (No Culture) - Urine, Clean Catch [[id number]]  (Abnormal) Collected:  11/16/19 1916    Specimen:  Urine, Clean Catch Updated:  11/16/19 1958     Color, UA [place]     Comment: Any Substance that causes an abnormal urine color can alter the accuracy of the chemical reactions.        Appearance, UA Clear     pH, UA 5.5     Specific Gravity, UA >=1.030     Glucose, UA Negative     Ketones, UA 40 mg/dL (2+)     Bilirubin, UA Negative     Blood, UA Trace     Protein, UA >=300 mg/dL (3+)     Leuk Esterase, UA Trace     Nitrite, UA Positive     Urobilinogen, UA 1.0 E.U./dL    aPTT [[id number]]  (Normal) Collected:  11/16/19 1521    Specimen:  Blood from Arm, Right Updated:  11/16/19 1910     PTT 30.9 seconds     Protime-INR [[id number]]  (Normal) Collected:  11/16/19 1521    Specimen:  Blood from Arm, Right Updated:  11/16/19 1910     Protime 13.2 Seconds      INR 1.03    Troponin [[id number]]  (Normal) Collected:  11/16/19 1521    Specimen:  Blood Updated:  11/16/19 1603     Troponin T <0.010 ng/mL     Narrative:       Troponin T Reference Range:  <= 0.03 ng/mL-   Negative for AMI  >0.03 ng/mL-     Abnormal for myocardial necrosis.  Clinicians would have  to utilize clinical acumen, EKG, Troponin and serial changes to determine if it is an Acute Myocardial Infarction or myocardial injury due to an underlying chronic condition.     Comprehensive Metabolic Panel [[id number]]  (Abnormal) Collected:  11/16/19 0940    Specimen:  Blood Updated:  11/16/19 1016     Glucose 113 mg/dL      BUN 12 mg/dL      Creatinine 0.89 mg/dL      Sodium 141 mmol/L      Potassium 3.6 mmol/L      Chloride 97 mmol/L      CO2 28.0 mmol/L      Calcium 9.5 mg/dL      Total Protein 7.2 g/dL      Albumin 4.10 g/dL      ALT (SGPT) 12 U/L      AST (SGOT) 22 U/L      Alkaline Phosphatase 87 U/L      Total Bilirubin 1.7 mg/dL      eGFR Non African Amer 83 mL/min/1.73      Globulin 3.1 gm/dL      A/G Ratio 1.3 g/dL      BUN/Creatinine Ratio 13.5     Anion Gap 16.0 mmol/L     Narrative:       GFR Normal >60  Chronic Kidney Disease <60  Kidney Failure <15    Troponin [[id number]]  (Normal) Collected:  11/16/19 0940    Specimen:  Blood Updated:  11/16/19 1016     Troponin T <0.010 ng/mL     Narrative:       Troponin T Reference Range:  <= 0.03 ng/mL-   Negative for AMI  >0.03 ng/mL-     Abnormal for myocardial necrosis.  Clinicians would have to utilize clinical acumen, EKG, Troponin and serial changes to determine if it is an Acute Myocardial Infarction or myocardial injury due to an underlying chronic condition.     CK [[id number]]  (Normal) Collected:  11/16/19 0940    Specimen:  Blood Updated:  11/16/19 1016     Creatine Kinase 127 U/L     CBC & Differential [[id number]] Collected:  11/16/19 0940    Specimen:  Blood Updated:  11/16/19 0959    Narrative:       The following orders were created for panel order CBC & Differential.  Procedure                               Abnormality         Status                     ---------                               -----------         ------                     CBC Auto Differential[[id number]]        Abnormal            Final result                 Please view  results for these tests on the individual orders.    CBC Auto Differential [[id number]]  (Abnormal) Collected:  11/16/19 0940    Specimen:  Blood Updated:  11/16/19 0959     WBC 9.04 10*3/mm3      RBC 6.00 10*6/mm3      Hemoglobin 19.3 g/dL      Hematocrit 58.1 %      MCV 96.8 fL      MCH 32.2 pg      MCHC 33.2 g/dL      RDW 16.2 %      RDW-SD 54.3 fl      MPV 9.9 fL      Platelets 145 10*3/mm3      Neutrophil % 83.6 %      Lymphocyte % 7.7 %      Monocyte % 7.3 %      Eosinophil % 0.2 %      Basophil % 0.6 %      Immature Grans % 0.6 %      Neutrophils, Absolute 7.56 10*3/mm3      Lymphocytes, Absolute 0.70 10*3/mm3      Monocytes, Absolute 0.66 10*3/mm3      Eosinophils, Absolute 0.02 10*3/mm3      Basophils, Absolute 0.05 10*3/mm3      Immature Grans, Absolute 0.05 10*3/mm3      nRBC 0.0 /100 WBC         Data Review:  Results from last 7 days   Lab Units 11/20/19  0523 11/19/19  0333 11/18/19  0557   SODIUM mmol/L 141 139 139   POTASSIUM mmol/L 3.5 4.0 3.6   CHLORIDE mmol/L 103 103 103   CO2 mmol/L 27.0 24.9 25.9   BUN mg/dL 16 19 18   CREATININE mg/dL 0.72* 0.66* 0.73*   GLUCOSE mg/dL 104* 102* 109*   CALCIUM mg/dL 8.8 8.8 8.6     Results from last 7 days   Lab Units 11/20/19  0523 11/19/19  0333 11/18/19  0557   WBC 10*3/mm3 7.49 7.86 9.37   HEMOGLOBIN g/dL 14.7 14.6 14.9   HEMATOCRIT % 42.9 44.2 42.5   PLATELETS 10*3/mm3 169 153 150     Results from last 7 days   Lab Units 11/17/19  0330   TSH uIU/mL 2.010         Lab Results   Lab Value Date/Time    TROPONINT <0.010 11/17/2019 0330    TROPONINT <0.010 11/16/2019 1521    TROPONINT <0.010 11/16/2019 0940         Results from last 7 days   Lab Units 11/16/19  0940   ALK PHOS U/L 87   BILIRUBIN mg/dL 1.7*   ALT (SGPT) U/L 12   AST (SGOT) U/L 22     Results from last 7 days   Lab Units 11/17/19  0330   TSH uIU/mL 2.010         No results found for: POCGLU  Results from last 7 days   Lab Units 11/16/19  1521   INR  1.03       Past Medical History:   Diagnosis Date    • GERD (gastroesophageal reflux disease)    • Hyperlipidemia    • Hypertension        Assessment:  Active Hospital Problems    Diagnosis  POA   • Hypertension [I10]  Yes   • Hyperlipidemia [E78.5]  Unknown   • GERD (gastroesophageal reflux disease) [K21.9]  Unknown   • Chest pain, atypical [R07.89]  Unknown   • Abnormal EKG [R94.31]  Yes      Resolved Hospital Problems    Diagnosis Date Resolved POA   • **Closed fracture of neck of left femur (CMS/HCC) [S72.002A] 11/17/2019 Yes       Plan:  Continue post op care. Follow lab. Add norvasc for BP.  Will need SNU for rehab. Need precert.  DC planning.    Quang Mcadams MD  11/20/2019  10:52 AM

## 2019-11-20 NOTE — PLAN OF CARE
Problem: Patient Care Overview  Goal: Plan of Care Review  Outcome: Ongoing (interventions implemented as appropriate)   11/20/19 3443   Plan of Care Review   Progress improving   OTHER   Outcome Summary pt is POD #3 left hip. VSS. Pt is ambulating with walker and assist x 1. Pain managed with tylenol. Voiding function intact. Dressing c/d/i. Plan to d/c to rehab when stable   Coping/Psychosocial   Plan of Care Reviewed With patient       Problem: Fall Risk (Adult)  Goal: Absence of Fall  Outcome: Ongoing (interventions implemented as appropriate)      Problem: Skin Injury Risk (Adult)  Goal: Skin Health and Integrity  Outcome: Ongoing (interventions implemented as appropriate)   11/20/19 7622   Skin Injury Risk (Adult)   Skin Health and Integrity making progress toward outcome       Problem: Hip Arthroplasty (Total, Partial) (Adult)  Goal: Signs and Symptoms of Listed Potential Problems Will be Absent, Minimized or Managed (Hip Arthroplasty)  Outcome: Ongoing (interventions implemented as appropriate)   11/20/19 0599   Goal/Outcome Evaluation   Problems Assessed (Hip Arthroplasty) all   Problems Present (Hip Arthroplasty) situational response;functional deficit     Goal: Anesthesia/Sedation Recovery  Outcome: Ongoing (interventions implemented as appropriate)   11/20/19 0508   Goal/Outcome Evaluation   Anesthesia/Sedation Recovery progressing toward baseline

## 2019-11-21 VITALS
OXYGEN SATURATION: 98 % | RESPIRATION RATE: 18 BRPM | TEMPERATURE: 96.3 F | SYSTOLIC BLOOD PRESSURE: 156 MMHG | HEIGHT: 71 IN | WEIGHT: 162.26 LBS | HEART RATE: 71 BPM | DIASTOLIC BLOOD PRESSURE: 96 MMHG | BODY MASS INDEX: 22.72 KG/M2

## 2019-11-21 PROBLEM — I45.10 RBBB: Status: ACTIVE | Noted: 2019-11-21

## 2019-11-21 PROBLEM — R07.89 CHEST PAIN, ATYPICAL: Status: RESOLVED | Noted: 2019-11-16 | Resolved: 2019-11-21

## 2019-11-21 PROBLEM — S72.002A CLOSED FRACTURE OF NECK OF LEFT FEMUR (HCC): Status: RESOLVED | Noted: 2019-11-16 | Resolved: 2019-11-21

## 2019-11-21 LAB
ANION GAP SERPL CALCULATED.3IONS-SCNC: 13.1 MMOL/L (ref 5–15)
BASOPHILS # BLD AUTO: 0.07 10*3/MM3 (ref 0–0.2)
BASOPHILS NFR BLD AUTO: 0.9 % (ref 0–1.5)
BUN BLD-MCNC: 12 MG/DL (ref 8–23)
BUN/CREAT SERPL: 21.1 (ref 7–25)
CALCIUM SPEC-SCNC: 8.6 MG/DL (ref 8.6–10.5)
CHLORIDE SERPL-SCNC: 106 MMOL/L (ref 98–107)
CO2 SERPL-SCNC: 25.9 MMOL/L (ref 22–29)
CREAT BLD-MCNC: 0.57 MG/DL (ref 0.76–1.27)
DEPRECATED RDW RBC AUTO: 54.3 FL (ref 37–54)
EOSINOPHIL # BLD AUTO: 0.31 10*3/MM3 (ref 0–0.4)
EOSINOPHIL NFR BLD AUTO: 4.1 % (ref 0.3–6.2)
ERYTHROCYTE [DISTWIDTH] IN BLOOD BY AUTOMATED COUNT: 14.9 % (ref 12.3–15.4)
GFR SERPL CREATININE-BSD FRML MDRD: 139 ML/MIN/1.73
GLUCOSE BLD-MCNC: 114 MG/DL (ref 65–99)
HCT VFR BLD AUTO: 43.8 % (ref 37.5–51)
HGB BLD-MCNC: 14.4 G/DL (ref 13–17.7)
IMM GRANULOCYTES # BLD AUTO: 0.03 10*3/MM3 (ref 0–0.05)
IMM GRANULOCYTES NFR BLD AUTO: 0.4 % (ref 0–0.5)
LYMPHOCYTES # BLD AUTO: 1.35 10*3/MM3 (ref 0.7–3.1)
LYMPHOCYTES NFR BLD AUTO: 17.8 % (ref 19.6–45.3)
MCH RBC QN AUTO: 32.1 PG (ref 26.6–33)
MCHC RBC AUTO-ENTMCNC: 32.9 G/DL (ref 31.5–35.7)
MCV RBC AUTO: 97.8 FL (ref 79–97)
MONOCYTES # BLD AUTO: 0.79 10*3/MM3 (ref 0.1–0.9)
MONOCYTES NFR BLD AUTO: 10.4 % (ref 5–12)
NEUTROPHILS # BLD AUTO: 5.03 10*3/MM3 (ref 1.7–7)
NEUTROPHILS NFR BLD AUTO: 66.4 % (ref 42.7–76)
NRBC BLD AUTO-RTO: 0 /100 WBC (ref 0–0.2)
PLATELET # BLD AUTO: 197 10*3/MM3 (ref 140–450)
PMV BLD AUTO: 9.8 FL (ref 6–12)
POTASSIUM BLD-SCNC: 3.9 MMOL/L (ref 3.5–5.2)
RBC # BLD AUTO: 4.48 10*6/MM3 (ref 4.14–5.8)
SODIUM BLD-SCNC: 145 MMOL/L (ref 136–145)
WBC NRBC COR # BLD: 7.58 10*3/MM3 (ref 3.4–10.8)

## 2019-11-21 PROCEDURE — G0008 ADMIN INFLUENZA VIRUS VAC: HCPCS | Performed by: HOSPITALIST

## 2019-11-21 PROCEDURE — 80048 BASIC METABOLIC PNL TOTAL CA: CPT | Performed by: HOSPITALIST

## 2019-11-21 PROCEDURE — 90686 IIV4 VACC NO PRSV 0.5 ML IM: CPT | Performed by: HOSPITALIST

## 2019-11-21 PROCEDURE — 85025 COMPLETE CBC W/AUTO DIFF WBC: CPT | Performed by: HOSPITALIST

## 2019-11-21 PROCEDURE — 25010000002 INFLUENZA VAC SPLIT QUAD 0.5 ML SUSPENSION PREFILLED SYRINGE: Performed by: HOSPITALIST

## 2019-11-21 RX ORDER — ACETAMINOPHEN 500 MG
1000 TABLET ORAL EVERY 8 HOURS
Qty: 24 TABLET | Refills: 0
Start: 2019-11-21 | End: 2019-11-25

## 2019-11-21 RX ORDER — METOPROLOL SUCCINATE 25 MG/1
25 TABLET, EXTENDED RELEASE ORAL DAILY
Start: 2019-11-21

## 2019-11-21 RX ADMIN — INFLUENZA A VIRUS A/BRISBANE/02/2018 IVR-190 (H1N1) ANTIGEN (PROPIOLACTONE INACTIVATED), INFLUENZA A VIRUS A/KANSAS/14/2017 X-327 (H3N2) ANTIGEN (PROPIOLACTONE INACTIVATED), INFLUENZA B VIRUS B/MARYLAND/15/2016 ANTIGEN (PROPIOLACTONE INACTIVATED), INFLUENZA B VIRUS B/PHUKET/3073/2013 BVR-1B ANTIGEN (PROPIOLACTONE INACTIVATED) 0.5 ML: 15; 15; 15; 15 INJECTION, SUSPENSION INTRAMUSCULAR at 10:40

## 2019-11-21 RX ADMIN — METOPROLOL TARTRATE 25 MG: 25 TABLET ORAL at 07:57

## 2019-11-21 RX ADMIN — AMLODIPINE BESYLATE 5 MG: 5 TABLET ORAL at 07:57

## 2019-11-21 RX ADMIN — FAMOTIDINE 20 MG: 20 TABLET, FILM COATED ORAL at 07:57

## 2019-11-21 RX ADMIN — APIXABAN 2.5 MG: 2.5 TABLET, FILM COATED ORAL at 07:57

## 2019-11-21 RX ADMIN — ACETAMINOPHEN 1000 MG: 500 TABLET, FILM COATED ORAL at 04:44

## 2019-11-21 NOTE — PROGRESS NOTES
Case Management Discharge Note      Final Note: Skilled bed at [facility]. Blanca Jaquez [facility]          Destination - Selection Complete      Service Provider Request Status Selected Services Address Phone Number Fax Number    [facility] [address] [facility] [id number] [phone]      Durable Medical Equipment      No service has been selected for the patient.      Dialysis/Infusion      No service has been selected for the patient.      Home Medical Care      No service has been selected for the patient.      Therapy      No service has been selected for the patient.      Community Resources      No service has been selected for the patient.             Final Discharge Disposition Code: 03 - skilled nursing facility (SNF)

## 2019-11-21 NOTE — PLAN OF CARE
"Problem: Patient Care Overview  Goal: Plan of Care Review  Outcome: Ongoing (interventions implemented as appropriate)   11/20/19 2107   Plan of Care Review   Progress improving   OTHER   Outcome Summary Patient is ambulating using walker and x1-2 assist. Vitals are stable and voiding function is intact. Pain is minimal and managed with scheduled Tyelnol. MIRIAN dressing changed after being unable to patch \"leak\", \"ok\" indicator now present and blinking green. Patient anticipates d/c to snf tomorrow.    Coping/Psychosocial   Plan of Care Reviewed With patient       Problem: Fall Risk (Adult)  Goal: Absence of Fall  Outcome: Ongoing (interventions implemented as appropriate)   11/20/19 2107   Fall Risk (Adult)   Absence of Fall achieves outcome       Problem: Skin Injury Risk (Adult)  Goal: Skin Health and Integrity  Outcome: Ongoing (interventions implemented as appropriate)   11/20/19 2107   Skin Injury Risk (Adult)   Skin Health and Integrity making progress toward outcome       Problem: Fracture Orthopaedic (Adult)  Goal: Signs and Symptoms of Listed Potential Problems Will be Absent, Minimized or Managed (Fracture Orthopaedic)  Outcome: Ongoing (interventions implemented as appropriate)   11/20/19 2107   Goal/Outcome Evaluation   Problems Assessed (Orthopaedic Fracture) all   Problems Present (Orthopaedic Fracture) pain;functional deficit/self-care deficit       Problem: Hip Arthroplasty (Total, Partial) (Adult)  Goal: Signs and Symptoms of Listed Potential Problems Will be Absent, Minimized or Managed (Hip Arthroplasty)  Outcome: Ongoing (interventions implemented as appropriate)   11/20/19 2107   Goal/Outcome Evaluation   Problems Assessed (Hip Arthroplasty) all   Problems Present (Hip Arthroplasty) pain;functional deficit     Goal: Anesthesia/Sedation Recovery  Outcome: Outcome(s) achieved Date Met: 11/20/19 11/20/19 2107   Goal/Outcome Evaluation   Anesthesia/Sedation Recovery recovered to baseline         "

## 2019-11-21 NOTE — PROGRESS NOTES
Continued Stay Note  [facility]     Patient Name: Corey Lawson  MRN: [id number]  Today's Date: 11/21/2019    Admit Date: 11/16/2019    Discharge Plan     Row Name 11/21/19 0912       Plan    Plan  Deven Guzman    Patient/Family in Agreement with Plan  yes    Plan Comments  Transfer packet updated and dc summary faxed.  Spoke with wife, Francie, and she will transport patient today.  She [place] to  [place] 1330 [place] the airport so she would prefer dc before then. Valencia Vanegas RN        Discharge Codes    No documentation.       Expected Discharge Date and Time     Expected Discharge Date Expected Discharge Time    Nov 21, 2019             Valencia Vanegas RN

## 2019-11-21 NOTE — PROGRESS NOTES
Patient: Corey Lawson  YOB: 1940     Date of Admission: 11/16/2019  9:15 AM Medical Record Number: [id number]     Attending Physician: Branden Mesa MD    Procedure(s):  TOTAL HIP ARTHROPLASTY ANTERIOR WITH HANA TABLE Post Operative Day Number: 4    Subjective : No new orthopaedic complaints     Pain Relief: some relief with present medication.     Systemic Complaints: No complaints  Vitals:    11/20/19 1500 11/20/19 1900 11/20/19 2319 11/21/19 0300   BP: 156/87 150/86 159/91 150/78   BP Location: Right leg Right arm Right arm Right arm   Patient Position: Sitting Lying Lying Sitting   Pulse: 67 75 66 62   Resp: 18 18 18 18   Temp: 97.6 °F (36.4 °C) 98.3 °F (36.8 °C) 97 °F (36.1 °C) 97 °F (36.1 °C)   TempSrc: Oral Oral Oral Oral   SpO2: 97% 96% 96% 96%   Weight:       Height:           Physical Exam: 79 y.o. male    General Appearance:       Alert, cooperative, in no acute distress                  Extremities:   Dressing Clean, Dry and Intact         Incision healthy without signs or symptoms of infections         No clinical sign of DVT        Able to do good movements of digits    Pulses:  Pulses palpable and equal bilaterally           Diagnostic Tests:     Results from last 7 days   Lab Units 11/21/19 0419 11/20/19 0523 11/19/19  0333   WBC 10*3/mm3 7.58 7.49 7.86   HEMOGLOBIN g/dL 14.4 14.7 14.6   HEMATOCRIT % 43.8 42.9 44.2   PLATELETS 10*3/mm3 197 169 153     Results from last 7 days   Lab Units 11/21/19 0419 11/20/19 0523 11/19/19  0333   SODIUM mmol/L 145 141 139   POTASSIUM mmol/L 3.9 3.5 4.0   CHLORIDE mmol/L 106 103 103   CO2 mmol/L 25.9 27.0 24.9   BUN mg/dL 12 16 19   CREATININE mg/dL 0.57* 0.72* 0.66*   GLUCOSE mg/dL 114* 104* 102*   CALCIUM mg/dL 8.6 8.8 8.8     Results from last 7 days   Lab Units 11/16/19  1521   INR  1.03   APTT seconds 30.9     No results found for: CRP  No results found for: SEDRATE  No results found for: URICACID  No results found for:  CRYSTAL  Microbiology Results (last 10 days)     ** No results found for the last 240 hours. **        No radiology results for the last 7 days          Current Medications:  Scheduled Meds:  acetaminophen 1,000 mg Oral Q8H   amLODIPine 5 mg Oral Q24H   apixaban 2.5 mg Oral Q12H   famotidine 20 mg Oral BID AC   influenza vaccine 0.5 mL Intramuscular Once   metoprolol tartrate 25 mg Oral Q12H   sodium chloride 10 mL Intravenous Q12H     Continuous Infusions:  lactated ringers 9 mL/hr Last Rate: 9 mL/hr (11/17/19 0752)     PRN Meds:.•  acetaminophen **OR** acetaminophen **OR** acetaminophen  •  bisacodyl  •  bisacodyl  •  docusate sodium  •  hydrALAZINE  •  HYDROcodone-acetaminophen  •  HYDROmorphone **AND** naloxone  •  ondansetron **OR** ondansetron  •  sodium chloride  •  traMADol    Assessment: Procedure(s):  TOTAL HIP ARTHROPLASTY ANTERIOR WITH HANA TABLE    Patient Active Problem List   Diagnosis   • Closed fracture of neck of left femur (CMS/HCC)   • Hypertension   • Hyperlipidemia   • GERD (gastroesophageal reflux disease)   • Chest pain, atypical   • Abnormal EKG       PLAN:   Continues current post-op course  Anticoagulation: Eliquis  Dressing Change in am  Mobilize with PT as tolerated per protocol    Weight Bearing: WBAT  Discharge Plan: OK to plan for discharge in  today to SNF  from orthopadic perspective.      NATHANAEL Kuhn    Date: 11/21/2019    Time: 6:20 AM

## 2019-11-21 NOTE — PLAN OF CARE
Problem: Patient Care Overview  Goal: Plan of Care Review  Outcome: Ongoing (interventions implemented as appropriate)   11/21/19 0753   Plan of Care Review   Progress improving   OTHER   Outcome Summary Pt is POD# 4 of left total hip. Pt is ambulating with walker and asist x 1. VSS Voiding function is intact. Pt had some diarrhea early in shift.Pain has been managed with PO tylenol. MIRIAN dressing in place and is c/d/i Pt plans to d/c today to rehab.    Coping/Psychosocial   Plan of Care Reviewed With patient       Problem: Fall Risk (Adult)  Goal: Absence of Fall  Outcome: Ongoing (interventions implemented as appropriate)   11/21/19 0753   Fall Risk (Adult)   Absence of Fall achieves outcome       Problem: Skin Injury Risk (Adult)  Goal: Skin Health and Integrity  Outcome: Ongoing (interventions implemented as appropriate)   11/21/19 0753   Skin Injury Risk (Adult)   Skin Health and Integrity making progress toward outcome       Problem: Fracture Orthopaedic (Adult)  Goal: Signs and Symptoms of Listed Potential Problems Will be Absent, Minimized or Managed (Fracture Orthopaedic)  Outcome: Ongoing (interventions implemented as appropriate)   11/21/19 0753   Goal/Outcome Evaluation   Problems Assessed (Orthopaedic Fracture) all   Problems Present (Orthopaedic Fracture) pain;functional deficit/self-care deficit       Problem: Hip Arthroplasty (Total, Partial) (Adult)  Goal: Signs and Symptoms of Listed Potential Problems Will be Absent, Minimized or Managed (Hip Arthroplasty)  Outcome: Ongoing (interventions implemented as appropriate)   11/21/19 0753   Goal/Outcome Evaluation   Problems Assessed (Hip Arthroplasty) all   Problems Present (Hip Arthroplasty) functional deficit;pain

## 2019-11-21 NOTE — DISCHARGE SUMMARY
Patient Name: Corey Lawson  : 1940  MRN: [id number]    Date of Admission: 2019  Date of Discharge:  2019  Primary Care Physician: Provider, No Known      Chief Complaint:   Weakness - Generalized and Leg Pain      Discharge Diagnoses     Active Hospital Problems    Diagnosis  POA   • **Closed fracture of neck of left femur (CMS/[facility]) [S72.002A]  Yes   • RBBB [I45.10]  Yes   • Hypertension [I10]  Yes   • Hyperlipidemia [E78.5]  Yes   • GERD (gastroesophageal reflux disease) [K21.9]  Yes   • Abnormal EKG [R94.31]  Yes      Resolved Hospital Problems    Diagnosis Date Resolved POA   • Chest pain, atypical [R07.89] 2019 Yes        Hospital Course     Mr. Lawson is a 79 y.o. male former smoker with a history of hypertension who presented to [facility] initially complaining of left hip pain after a fall.  Please see the admitting history and physical for further details.  He was found to have fractured left hip and was admitted to the hospital for further evaluation and treatment.  He was seen by orthopedic surgery who performed anterior total hip arthroplasty on .  He tolerated the procedure well and has had no significant postoperative complications.  His pain is controlled with Tylenol.  He is progressed with physical therapy and is been cleared for discharge by orthopedics.  He is on Eliquis for DVT prophylaxis.  He will follow-up with Dr. Reyes for postoperative care.    Patient was previously noncompliant with blood pressure medication.  He has been prescribed Zestoretic in the past and had high blood pressures during his stay.  He will need outpatient follow-up with a primary care provider.  He had moderate erythrocytosis on admission with hemoglobin 19.3.  Today at discharge hemoglobin normal at 14.4.  This needs continued monitoring in the outpatient setting.  He also had abnormal EKG on admission with evidence of prior inferior infarct along with a right bundle  branch block.  No prior EKG for comparison therefore unable to determine the chronicity of these findings.  He was started on metoprolol during his stay which will be continued at discharge.  As stated he needs follow-up with a PCP.      Day of Discharge     Reports 2-3 loose stool since yesterday.  No abdominal pain or distention.  Tolerating a diet without difficulty.  Pain adequately controlled with Tylenol.    Physical Exam:  Temp:  [96.3 °F (35.7 °C)-98.3 °F (36.8 °C)] 96.3 °F (35.7 °C)  Heart Rate:  [57-75] 71  Resp:  [18] 18  BP: (150-159)/(78-96) 156/96  Body mass index is 22.72 kg/m².  Physical Exam   Constitutional: He is oriented to person, place, and time. No distress.   Cardiovascular: Normal rate and regular rhythm.   No murmur heard.  Pulmonary/Chest: Effort normal and breath sounds normal.   Abdominal: Soft. Bowel sounds are normal. He exhibits no distension. There is no tenderness.   Musculoskeletal: Normal range of motion. He exhibits no edema.   Neurological: He is alert and oriented to person, place, and time.   Skin: Skin is warm and dry. He is not diaphoretic.       Consultants     Consult Orders (all) (From admission, onward)    Start     Ordered    11/16/19 1043  Ortho (on-call MD unless specified)  Once     Specialty:  Orthopedic Surgery  Provider:  Dary Reyes MD    11/16/19 1043        Procedures     TOTAL HIP ARTHROPLASTY ANTERIOR WITH HANA TABLE  Dary Reyes MD  11/17/2019    Imaging Results (All)     Procedure Component Value Units Date/Time    XR Hip With or Without Pelvis 1 View Left [[id number]] Collected:  11/17/19 1125     Updated:  11/17/19 1402    Narrative:       ONE VIEW PORTABLE LEFT HIP AND PORTABLE AP PELVIS     HISTORY: Hip replacement for fracture.     FINDINGS: The patient has had recent total hip replacement and the  alignment appears satisfactory.       XR Hip With or Without Pelvis 1 View Left [[id number]] Collected:  11/17/19 1050     Updated:   11/17/19 1119    Narrative:       2 VIEW PORTABLE RIGHT HIP IN OR     HISTORY: Hip replacement for fracture.     FINDINGS: Imaging in the operating room was performed at the time of  total hip replacement and the alignment appears satisfactory. 6 images  were obtained and the fluoroscopy time measures 33 seconds.       FL C Arm During Surgery [[id number]] Resulted:  11/17/19 1025     Updated:  11/17/19 1025    Narrative:       This procedure was auto-finalized with no dictation required.    XR Chest 1 View [[id number]] Collected:  11/16/19 1049     Updated:  11/16/19 1210    Narrative:       ONE VIEW AP PELVIS AND TWO VIEW LEFT FEMUR     HISTORY: Fell. Hip pain.     FINDINGS: There is a fracture through the neck of the left femur with  very slight superior elevation of the femur relative to the femoral  head. The remainder of the femur is unremarkable.     ONE VIEW SUPINE CHEST     HISTORY: Fell. Hip fracture. Hypertension.     FINDINGS: The lungs are well expanded and clear and the heart is top  normal in size. There is no acute disease.       XR Pelvis 1 or 2 View [[id number]] Collected:  11/16/19 1049     Updated:  11/16/19 1210    Narrative:       ONE VIEW AP PELVIS AND TWO VIEW LEFT FEMUR     HISTORY: Fell. Hip pain.     FINDINGS: There is a fracture through the neck of the left femur with  very slight superior elevation of the femur relative to the femoral  head. The remainder of the femur is unremarkable.     ONE VIEW SUPINE CHEST     HISTORY: Fell. Hip fracture. Hypertension.     FINDINGS: The lungs are well expanded and clear and the heart is top  normal in size. There is no acute disease.       XR Femur 2 View Left [[id number]] Collected:  11/16/19 1049     Updated:  11/16/19 1210    Narrative:       ONE VIEW AP PELVIS AND TWO VIEW LEFT FEMUR     HISTORY: Fell. Hip pain.     FINDINGS: There is a fracture through the neck of the left femur with  very slight superior elevation of the femur relative to the  femoral  head. The remainder of the femur is unremarkable.     ONE VIEW SUPINE CHEST     HISTORY: Fell. Hip fracture. Hypertension.     FINDINGS: The lungs are well expanded and clear and the heart is top  normal in size. There is no acute disease.       CT Head Without Contrast [[id number]] Collected:  11/16/19 1044     Updated:  11/16/19 1210    Narrative:       CT BRAIN WITHOUT CONTRAST     HISTORY: Fell. Weakness in left leg.     TECHNIQUE/FINDINGS: The CT scan was performed as an emergency procedure  through the brain without contrast. There is mild diffuse atrophy and  chronic small vessel ischemic change. There is slight asymmetry of the  lateral ventricles which is within normal limits and there is no  evidence of acute hemorrhage or mass effect. The visualized sinuses are  clear.              Pertinent Labs     Results from last 7 days   Lab Units 11/21/19 0419 11/20/19 0523 11/19/19 0333 11/18/19  0557   WBC 10*3/mm3 7.58 7.49 7.86 9.37   HEMOGLOBIN g/dL 14.4 14.7 14.6 14.9   PLATELETS 10*3/mm3 197 169 153 150     Results from last 7 days   Lab Units 11/21/19 0419 11/20/19 0523 11/19/19 0333 11/18/19  0557   SODIUM mmol/L 145 141 139 139   POTASSIUM mmol/L 3.9 3.5 4.0 3.6   CHLORIDE mmol/L 106 103 103 103   CO2 mmol/L 25.9 27.0 24.9 25.9   BUN mg/dL 12 16 19 18   CREATININE mg/dL 0.57* 0.72* 0.66* 0.73*   GLUCOSE mg/dL 114* 104* 102* 109*   Estimated Creatinine Clearance: 80.5 mL/min (A) (by C-G formula based on SCr of 0.57 mg/dL (L)).  Results from last 7 days   Lab Units 11/16/19  0940   ALBUMIN g/dL 4.10   BILIRUBIN mg/dL 1.7*   ALK PHOS U/L 87   AST (SGOT) U/L 22   ALT (SGPT) U/L 12     Results from last 7 days   Lab Units 11/21/19 0419 11/20/19 0523 11/19/19 0333 11/18/19  0557  11/16/19  0940   CALCIUM mg/dL 8.6 8.8 8.8 8.6   < > 9.5   ALBUMIN g/dL  --   --   --   --   --  4.10    < > = values in this interval not displayed.       Results from last 7 days   Lab Units 11/17/19  9098  11/16/19  1521 11/16/19  0940   CK TOTAL U/L  --   --  127   TROPONIN T ng/mL <0.010 <0.010 <0.010     Results from last 7 days   Lab Units 11/16/19  1933   URINE MYOGLOBIN, QUALITATIVE  Positive*         Test Results Pending at Discharge       Discharge Details        Discharge Medications      New Medications      Instructions Start Date   acetaminophen 500 MG tablet  Commonly known as:  TYLENOL   1,000 mg, Oral, Every 8 Hours      apixaban 2.5 MG tablet tablet  Commonly known as:  ELIQUIS   2.5 mg, Oral, Every 12 Hours Scheduled      metoprolol succinate XL 25 MG 24 hr tablet  Commonly known as:  TOPROL XL   25 mg, Oral, Daily         Stop These Medications    lisinopril-hydrochlorothiazide 20-12.5 MG per tablet  Commonly known as:  PRINZIDE,ZESTORETIC            Allergies   Allergen Reactions   • Nexium [Esomeprazole Magnesium] Diarrhea         Discharge Disposition:  Skilled Nursing Facility (DC - External)    Discharge Diet:  Diet Order   Procedures   • Diet Regular       Discharge Activity:   Activity Instructions     Activity as Tolerated            CODE STATUS:    Code Status and Medical Interventions:   Ordered at: 11/17/19 1200     Level Of Support Discussed With:    Patient     Code Status:    CPR     Medical Interventions (Level of Support Prior to Arrest):    Full       No future appointments.   Contact information for follow-up providers     Dary Reyes MD Follow up on 12/16/2019.    Specialty:  Orthopedic Surgery  Contact information:  [address]  [facility] [address]  [place] [address] [phone]             Provider, No Known .    Contact information:  [facility] [address] [phone]                   Contact information for after-discharge care     Destination     [facility] Follow up.    Service:  Skilled Nursing  Contact information:  [address] [facility] [address] [phone]                           Time Spent  on Discharge:  Greater than 30 minutes      Branden Mesa MD  [facility]  11/21/19  9:18 AM

## (undated) DEVICE — SUT MNCRYL 3/0 PS2 18IN MCP497G

## (undated) DEVICE — GLV SURG BIOGEL LTX PF 8

## (undated) DEVICE — PICO 7 10CM X 30CM: Brand: PICO™ 7

## (undated) DEVICE — SKIN PREP TRAY W/CHG: Brand: MEDLINE INDUSTRIES, INC.

## (undated) DEVICE — SHEET, DRAPE, SPLIT, STERILE: Brand: MEDLINE

## (undated) DEVICE — MAT FLR ABSORBENT LG 4FT 10 2.5FT

## (undated) DEVICE — DECANT BG O JET

## (undated) DEVICE — ANTIBACTERIAL UNDYED BRAIDED (POLYGLACTIN 910), SYNTHETIC ABSORBABLE SUTURE: Brand: COATED VICRYL

## (undated) DEVICE — SUT VIC 3/0 CTI 36IN J944H

## (undated) DEVICE — GLV SURG PREMIERPRO ORTHO LTX PF SZ8 BRN

## (undated) DEVICE — PK ANT HIP 40

## (undated) DEVICE — GLV SURG TRIUMPH CLASSIC PF LTX 8 STRL

## (undated) DEVICE — 3M™ STERI-STRIP™ REINFORCED ADHESIVE SKIN CLOSURES, R1547, 1/2 IN X 4 IN (12 MM X 100 MM), 6 STRIPS/ENVELOPE: Brand: 3M™ STERI-STRIP™

## (undated) DEVICE — APPL CHLORAPREP W/TINT 26ML ORNG

## (undated) DEVICE — 1010 S-DRAPE TOWEL DRAPE 10/BX: Brand: STERI-DRAPE™

## (undated) DEVICE — DRSNG WND GEL FIBR OPTICELL AG PLS W/SLV LF 4X5IN  STRL

## (undated) DEVICE — OPTIFOAM GENTLE SA, POSTOP, 4X8: Brand: MEDLINE

## (undated) DEVICE — DRSNG BURN ACTICOAT FLEX 7 1X24IN

## (undated) DEVICE — SOL ISO/ALC RUB 70PCT 4OZ

## (undated) DEVICE — SYR CONTRL LUERLOK 10CC